# Patient Record
Sex: FEMALE | Race: WHITE | Employment: UNEMPLOYED | URBAN - METROPOLITAN AREA
[De-identification: names, ages, dates, MRNs, and addresses within clinical notes are randomized per-mention and may not be internally consistent; named-entity substitution may affect disease eponyms.]

---

## 2017-07-26 ENCOUNTER — HOSPITAL ENCOUNTER (EMERGENCY)
Age: 77
Discharge: HOME | End: 2017-07-26
Attending: EMERGENCY MEDICINE
Payer: MEDICARE

## 2017-07-26 ENCOUNTER — APPOINTMENT (OUTPATIENT)
Dept: GENERAL RADIOLOGY | Age: 77
End: 2017-07-26
Payer: MEDICARE

## 2017-07-26 VITALS
SYSTOLIC BLOOD PRESSURE: 119 MMHG | DIASTOLIC BLOOD PRESSURE: 93 MMHG | WEIGHT: 170 LBS | OXYGEN SATURATION: 97 % | BODY MASS INDEX: 28.32 KG/M2 | TEMPERATURE: 98.1 F | RESPIRATION RATE: 18 BRPM | HEIGHT: 65 IN | HEART RATE: 91 BPM

## 2017-07-26 DIAGNOSIS — S80.02XA CONTUSION OF LEFT KNEE, INITIAL ENCOUNTER: Primary | ICD-10-CM

## 2017-07-26 DIAGNOSIS — S39.012A LUMBAR STRAIN, INITIAL ENCOUNTER: ICD-10-CM

## 2017-07-26 PROCEDURE — 72100 X-RAY EXAM L-S SPINE 2/3 VWS: CPT

## 2017-07-26 PROCEDURE — 99283 EMERGENCY DEPT VISIT LOW MDM: CPT

## 2017-07-26 PROCEDURE — 73562 X-RAY EXAM OF KNEE 3: CPT

## 2017-07-26 RX ORDER — TRAZODONE HYDROCHLORIDE 150 MG/1
25 TABLET ORAL NIGHTLY
COMMUNITY

## 2017-07-26 RX ORDER — GABAPENTIN 100 MG/1
200 CAPSULE ORAL NIGHTLY
COMMUNITY
End: 2022-06-06

## 2017-07-26 ASSESSMENT — PAIN DESCRIPTION - PAIN TYPE: TYPE: ACUTE PAIN

## 2017-07-26 ASSESSMENT — PAIN DESCRIPTION - LOCATION: LOCATION: KNEE

## 2017-07-26 ASSESSMENT — PAIN DESCRIPTION - DESCRIPTORS: DESCRIPTORS: ACHING;SHARP;BURNING

## 2017-07-26 ASSESSMENT — PAIN SCALES - GENERAL: PAINLEVEL_OUTOF10: 10

## 2018-02-22 ENCOUNTER — APPOINTMENT (OUTPATIENT)
Dept: GENERAL RADIOLOGY | Age: 78
End: 2018-02-22
Payer: MEDICAID

## 2018-02-22 ENCOUNTER — HOSPITAL ENCOUNTER (EMERGENCY)
Age: 78
Discharge: HOME | End: 2018-02-22
Attending: EMERGENCY MEDICINE
Payer: MEDICAID

## 2018-02-22 VITALS
OXYGEN SATURATION: 98 % | HEART RATE: 77 BPM | DIASTOLIC BLOOD PRESSURE: 77 MMHG | TEMPERATURE: 98.2 F | SYSTOLIC BLOOD PRESSURE: 126 MMHG | RESPIRATION RATE: 18 BRPM | BODY MASS INDEX: 28.12 KG/M2 | WEIGHT: 175 LBS | HEIGHT: 66 IN

## 2018-02-22 DIAGNOSIS — M25.471 RIGHT ANKLE EFFUSION: ICD-10-CM

## 2018-02-22 DIAGNOSIS — S82.401A CLOSED FRACTURE OF SHAFT OF RIGHT FIBULA, UNSPECIFIED FRACTURE MORPHOLOGY, INITIAL ENCOUNTER: Primary | ICD-10-CM

## 2018-02-22 PROCEDURE — 99283 EMERGENCY DEPT VISIT LOW MDM: CPT

## 2018-02-22 PROCEDURE — 73610 X-RAY EXAM OF ANKLE: CPT

## 2018-02-22 RX ORDER — HYDROCODONE BITARTRATE AND ACETAMINOPHEN 5; 325 MG/1; MG/1
1 TABLET ORAL EVERY 6 HOURS PRN
Qty: 15 TABLET | Refills: 0 | Status: SHIPPED | OUTPATIENT
Start: 2018-02-22 | End: 2018-03-01

## 2018-02-22 ASSESSMENT — ENCOUNTER SYMPTOMS
NAUSEA: 0
SHORTNESS OF BREATH: 0
EYE REDNESS: 0
SORE THROAT: 0
ABDOMINAL PAIN: 0
COUGH: 0
BACK PAIN: 0
COLOR CHANGE: 1
VOMITING: 0
EYE DISCHARGE: 0

## 2018-02-22 ASSESSMENT — PAIN DESCRIPTION - ORIENTATION: ORIENTATION: RIGHT

## 2018-02-22 ASSESSMENT — PAIN DESCRIPTION - LOCATION: LOCATION: ANKLE

## 2018-02-22 ASSESSMENT — PAIN DESCRIPTION - PAIN TYPE: TYPE: ACUTE PAIN

## 2018-02-22 ASSESSMENT — PAIN SCALES - GENERAL: PAINLEVEL_OUTOF10: 6

## 2018-02-22 ASSESSMENT — PAIN DESCRIPTION - DESCRIPTORS: DESCRIPTORS: ACHING

## 2018-02-22 NOTE — ED PROVIDER NOTES
history of Anxiety; Depression; Hyperlipidemia; and Neuropathy ([facility] [address] SURGICAL HISTORY      has a past surgical history that includes Appendectomy; Tonsillectomy; and Nasal polyp surgery. CURRENT MEDICATIONS       Discharge Medication List as of 2/22/2018  5:55 PM      CONTINUE these medications which have NOT CHANGED    Details   traZODone (DESYREL) 150 MG tablet Take 150 mg by mouth nightlyHistorical Med      gabapentin (NEURONTIN) 100 MG capsule Take 200 mg by mouth nightlyHistorical Med      Rosuvastatin Calcium (CRESTOR PO) Take  by mouth. LORazepam (ATIVAN) 0.5 MG tablet Take 0.5 mg by mouth 2 times daily. venlafaxine (EFFEXOR) 37.5 MG tablet Take 150 mg by mouth daily Historical Med      aspirin 81 MG chewable tablet Take 81 mg by mouth daily. diphenhydrAMINE (BENADRYL) 25 MG tablet Take 25 mg by mouth every 6 hours as needed for Itching. ALLERGIES     has No Known Allergies. FAMILY HISTORY     Not relevant to the current problem. SOCIAL HISTORY      reports that she quit smoking about 35 years ago. She has never used smokeless tobacco. She reports that she does not drink alcohol or use drugs. PHYSICAL EXAM     (7 for level 4, 8 or more for level 5)    ED Triage Vitals [02/22/18 1636]   BP Temp Temp Source Pulse Resp SpO2 Height Weight   126/77 98.2 °F (36.8 °C) Oral 77 18 98 % 5' 5.5\" (1.664 m) 175 lb (79.4 kg)     Physical Exam   Constitutional: She appears well-developed and well-nourished. No distress. Nontoxic. HENT:   Head: Normocephalic and atraumatic. Eyes: No scleral icterus. Cardiovascular: Normal rate. Pulmonary/Chest: Effort normal. No respiratory distress. Musculoskeletal:        Right ankle: She exhibits swelling and ecchymosis. She exhibits normal range of motion, no deformity and normal pulse. Tenderness. Lateral malleolus and medial malleolus tenderness found. No head of 5th metatarsal and no proximal fibula tenderness found. Feet:    Right ankle: swelling and ecchymosis noted over the lateral and medial malleoli; patient has some mild tenderness to palpation over the lateral malleolus; no proximal fibular tenderness; no fifth metatarsal tenderness; palpable dorsal pedal pulse; right lower extremity is neurovascularly intact. Neurological: She is alert. Grossly intact. Skin: Skin is warm and dry. No rash noted. Psychiatric: She has a normal mood and affect. Her behavior is normal.   Vitals reviewed. DIAGNOSTIC RESULTS     RADIOLOGY:   Radiology images were visualized by myself. The Radiologist interpretations were reviewed and are as follows:     XR ANKLE RIGHT (MIN 3 VIEWS) (Final result)   Result time 02/22/18 17:17:26   Final result by Toni Randall MD (02/22/18 17:17:26)                Impression:    Acute laterally displaced fracture through the distal right fibular  metaphysis with small tibiotalar joint effusion and circumferential soft  tissue swelling about the ankle.  Widening of the medial ankle mortise. Narrative:    EXAMINATION:  3 VIEWS OF THE RIGHT ANKLE    2/22/2018 5:08 pm    COMPARISON:  None. HISTORY:  ORDERING SYSTEM PROVIDED HISTORY: pain; missed a step and twisted right ankle  2 hours ago  TECHNOLOGIST PROVIDED HISTORY:  Reason for exam:->pain; missed a step and twisted right ankle 2 hours ago  Ordering Physician Provided Reason for Exam: Paulo Rosas today.  Diffuse ankle pain  Acuity: Acute  Type of Exam: Initial    77-year-old female with acute diffuse right ankle pain after twisting injury  and falling    FINDINGS:  Soft tissue swelling about the ankle. Acute laterally displaced fracture through the distal right fibular  metaphysis.  Small tibiotalar joint effusion. Widening of the medial ankle mortise.  Mild degenerative changes at the  tarsal metatarsal joints and midfoot.     Remote well corticated avulsion fracture fragment inferior to the medial  malleolus.  Boehler's angle is refusing an Ortho-Glass splint. She is also refusing the knee scooter. I will prescribe Norco for pain. She understands that she should be nonweightbearing on the right lower extremity. Follow-up with orthopedics. Nursing staff has contacted Dr. Marga Fleischer office for an appointment. Patient is scheduled for an appointment at 10:10 AM tomorrow. FINAL IMPRESSION      1. Closed fracture of shaft of right fibula, unspecified fracture morphology, initial encounter    2. Right ankle effusion        Active Hospital Problems    Diagnosis Date Noted    Closed fracture of shaft of right fibula [S82.401A] 02/22/2018    Right ankle effusion [M25.471] 02/22/2018     DISPOSITION/PLAN   DISPOSITION - home     Condition on Disposition  Stable    PATIENT REFERRED TO:  Brendon Sibley MD  [address] [facility] [address]  [phone]    Schedule an appointment as soon as possible for a visit in 5 days  Further evaluation and treatment    DISCHARGE MEDICATIONS:  Discharge Medication List as of 2/22/2018  5:55 PM      START taking these medications    Details   HYDROcodone-acetaminophen (NORCO) 5-325 MG per tablet Take 1 tablet by mouth every 6 hours as needed for Pain for up to 7 days. , Disp-15 tablet, R-0Print           (Please note that portions of this note were completed with a voice recognition program.  Efforts were made to edit the dictations but occasionally words are mis-transcribed.)    Patrick Hinojosa PA-C  02/22/18 8711       Patrick Hinojosa PA-C  03/07/18 350 Margaret Rojas PA-C  03/07/18 1400

## 2018-02-23 ENCOUNTER — OFFICE VISIT (OUTPATIENT)
Dept: ORTHOPEDIC SURGERY | Age: 78
End: 2018-02-23
Payer: MEDICAID

## 2018-02-23 VITALS — HEIGHT: 66 IN | WEIGHT: 174.16 LBS | BODY MASS INDEX: 27.99 KG/M2

## 2018-02-23 DIAGNOSIS — S82.401D CLOSED FRACTURE OF SHAFT OF RIGHT FIBULA WITH ROUTINE HEALING, UNSPECIFIED FRACTURE MORPHOLOGY, SUBSEQUENT ENCOUNTER: Primary | ICD-10-CM

## 2018-02-23 PROCEDURE — 99203 OFFICE O/P NEW LOW 30 MIN: CPT | Performed by: ORTHOPAEDIC SURGERY

## 2018-02-23 PROCEDURE — G8427 DOCREV CUR MEDS BY ELIG CLIN: HCPCS | Performed by: ORTHOPAEDIC SURGERY

## 2018-02-23 PROCEDURE — G8484 FLU IMMUNIZE NO ADMIN: HCPCS | Performed by: ORTHOPAEDIC SURGERY

## 2018-02-23 PROCEDURE — G8400 PT W/DXA NO RESULTS DOC: HCPCS | Performed by: ORTHOPAEDIC SURGERY

## 2018-02-23 PROCEDURE — G8419 CALC BMI OUT NRM PARAM NOF/U: HCPCS | Performed by: ORTHOPAEDIC SURGERY

## 2018-02-23 PROCEDURE — 1090F PRES/ABSN URINE INCON ASSESS: CPT | Performed by: ORTHOPAEDIC SURGERY

## 2018-02-23 PROCEDURE — 4040F PNEUMOC VAC/ADMIN/RCVD: CPT | Performed by: ORTHOPAEDIC SURGERY

## 2018-02-23 PROCEDURE — 1123F ACP DISCUSS/DSCN MKR DOCD: CPT | Performed by: ORTHOPAEDIC SURGERY

## 2018-02-23 PROCEDURE — 1036F TOBACCO NON-USER: CPT | Performed by: ORTHOPAEDIC SURGERY

## 2018-02-23 ASSESSMENT — ENCOUNTER SYMPTOMS
SHORTNESS OF BREATH: 0
BACK PAIN: 0
WHEEZING: 0

## 2018-02-27 DIAGNOSIS — S82.401D CLOSED FRACTURE OF SHAFT OF RIGHT FIBULA WITH ROUTINE HEALING, UNSPECIFIED FRACTURE MORPHOLOGY, SUBSEQUENT ENCOUNTER: Primary | ICD-10-CM

## 2018-03-01 ENCOUNTER — HOSPITAL ENCOUNTER (OUTPATIENT)
Dept: GENERAL RADIOLOGY | Age: 78
Discharge: HOME | End: 2018-03-03
Payer: MEDICAID

## 2018-03-01 ENCOUNTER — HOSPITAL ENCOUNTER (OUTPATIENT)
Age: 78
Discharge: HOME | End: 2018-03-01
Payer: MEDICAID

## 2018-03-01 ENCOUNTER — HOSPITAL ENCOUNTER (OUTPATIENT)
Age: 78
Discharge: HOME | End: 2018-03-03
Payer: MEDICAID

## 2018-03-01 ENCOUNTER — OFFICE VISIT (OUTPATIENT)
Dept: ORTHOPEDIC SURGERY | Age: 78
End: 2018-03-01
Payer: MEDICAID

## 2018-03-01 ENCOUNTER — ANESTHESIA EVENT (OUTPATIENT)
Dept: OPERATING ROOM | Age: 78
End: 2018-03-01
Payer: MEDICAID

## 2018-03-01 VITALS
SYSTOLIC BLOOD PRESSURE: 102 MMHG | WEIGHT: 174.16 LBS | BODY MASS INDEX: 27.99 KG/M2 | HEIGHT: 66 IN | HEART RATE: 69 BPM | DIASTOLIC BLOOD PRESSURE: 52 MMHG

## 2018-03-01 DIAGNOSIS — Z01.818 PRE-OP TESTING: ICD-10-CM

## 2018-03-01 DIAGNOSIS — S82.401D CLOSED FRACTURE OF SHAFT OF RIGHT FIBULA WITH ROUTINE HEALING, UNSPECIFIED FRACTURE MORPHOLOGY, SUBSEQUENT ENCOUNTER: Primary | ICD-10-CM

## 2018-03-01 LAB
-: NORMAL
ALBUMIN SERPL-MCNC: 4 G/DL (ref 3.5–5.2)
ALBUMIN/GLOBULIN RATIO: 1.5 (ref 1–2.5)
ALP BLD-CCNC: 84 U/L (ref 35–104)
ALT SERPL-CCNC: 11 U/L (ref 5–33)
AMORPHOUS: NORMAL
ANION GAP SERPL CALCULATED.3IONS-SCNC: 14 MMOL/L (ref 9–17)
AST SERPL-CCNC: 17 U/L
BACTERIA: NORMAL
BILIRUB SERPL-MCNC: 0.57 MG/DL (ref 0.3–1.2)
BILIRUBIN URINE: NEGATIVE
BUN BLDV-MCNC: 17 MG/DL (ref 8–23)
BUN/CREAT BLD: NORMAL (ref 9–20)
CALCIUM SERPL-MCNC: 9.6 MG/DL (ref 8.6–10.4)
CASTS UA: NORMAL /LPF (ref 0–8)
CHLORIDE BLD-SCNC: 103 MMOL/L (ref 98–107)
CO2: 27 MMOL/L (ref 20–31)
COLOR: YELLOW
COMMENT UA: ABNORMAL
CREAT SERPL-MCNC: 0.88 MG/DL (ref 0.5–0.9)
CRYSTALS, UA: NORMAL /HPF
EKG ATRIAL RATE: 68 BPM
EKG Q-T INTERVAL: 406 MS
EKG QRS DURATION: 78 MS
EKG QTC CALCULATION (BAZETT): 435 MS
EKG R AXIS: -3 DEGREES
EKG T AXIS: 46 DEGREES
EKG VENTRICULAR RATE: 69 BPM
EPITHELIAL CELLS UA: NORMAL /HPF (ref 0–5)
GFR AFRICAN AMERICAN: >60 ML/MIN
GFR NON-AFRICAN AMERICAN: >60 ML/MIN
GFR SERPL CREATININE-BSD FRML MDRD: NORMAL ML/MIN/{1.73_M2}
GFR SERPL CREATININE-BSD FRML MDRD: NORMAL ML/MIN/{1.73_M2}
GLUCOSE BLD-MCNC: 88 MG/DL (ref 70–99)
GLUCOSE URINE: NEGATIVE
HCT VFR BLD CALC: 36.7 % (ref 36.3–47.1)
HEMOGLOBIN: 11.4 G/DL (ref 11.9–15.1)
KETONES, URINE: ABNORMAL
LEUKOCYTE ESTERASE, URINE: ABNORMAL
MCH RBC QN AUTO: 27.4 PG (ref 25.2–33.5)
MCHC RBC AUTO-ENTMCNC: 31.1 G/DL (ref 28.4–34.8)
MCV RBC AUTO: 88.2 FL (ref 82.6–102.9)
MUCUS: NORMAL
NITRITE, URINE: NEGATIVE
NRBC AUTOMATED: 0 PER 100 WBC
OTHER OBSERVATIONS UA: NORMAL
PDW BLD-RTO: 14.8 % (ref 11.8–14.4)
PH UA: 6 (ref 5–8)
PLATELET # BLD: 289 K/UL (ref 138–453)
PMV BLD AUTO: 11.1 FL (ref 8.1–13.5)
POTASSIUM SERPL-SCNC: 4.1 MMOL/L (ref 3.7–5.3)
PROTEIN UA: NEGATIVE
RBC # BLD: 4.16 M/UL (ref 3.95–5.11)
RBC UA: NORMAL /HPF (ref 0–4)
RENAL EPITHELIAL, UA: NORMAL /HPF
SODIUM BLD-SCNC: 144 MMOL/L (ref 135–144)
SPECIFIC GRAVITY UA: 1.02 (ref 1–1.03)
TOTAL PROTEIN: 6.6 G/DL (ref 6.4–8.3)
TRICHOMONAS: NORMAL
TURBIDITY: CLEAR
URINE HGB: NEGATIVE
UROBILINOGEN, URINE: NORMAL
WBC # BLD: 8.4 K/UL (ref 3.5–11.3)
WBC UA: NORMAL /HPF (ref 0–5)
YEAST: NORMAL

## 2018-03-01 PROCEDURE — 71046 X-RAY EXAM CHEST 2 VIEWS: CPT

## 2018-03-01 PROCEDURE — G8427 DOCREV CUR MEDS BY ELIG CLIN: HCPCS | Performed by: ORTHOPAEDIC SURGERY

## 2018-03-01 PROCEDURE — 1090F PRES/ABSN URINE INCON ASSESS: CPT | Performed by: ORTHOPAEDIC SURGERY

## 2018-03-01 PROCEDURE — 4040F PNEUMOC VAC/ADMIN/RCVD: CPT | Performed by: ORTHOPAEDIC SURGERY

## 2018-03-01 PROCEDURE — 80053 COMPREHEN METABOLIC PANEL: CPT

## 2018-03-01 PROCEDURE — G8484 FLU IMMUNIZE NO ADMIN: HCPCS | Performed by: ORTHOPAEDIC SURGERY

## 2018-03-01 PROCEDURE — G8419 CALC BMI OUT NRM PARAM NOF/U: HCPCS | Performed by: ORTHOPAEDIC SURGERY

## 2018-03-01 PROCEDURE — 93005 ELECTROCARDIOGRAM TRACING: CPT

## 2018-03-01 PROCEDURE — 85027 COMPLETE CBC AUTOMATED: CPT

## 2018-03-01 PROCEDURE — 99213 OFFICE O/P EST LOW 20 MIN: CPT | Performed by: ORTHOPAEDIC SURGERY

## 2018-03-01 PROCEDURE — 36415 COLL VENOUS BLD VENIPUNCTURE: CPT

## 2018-03-01 PROCEDURE — G8400 PT W/DXA NO RESULTS DOC: HCPCS | Performed by: ORTHOPAEDIC SURGERY

## 2018-03-01 PROCEDURE — 1036F TOBACCO NON-USER: CPT | Performed by: ORTHOPAEDIC SURGERY

## 2018-03-01 PROCEDURE — 1123F ACP DISCUSS/DSCN MKR DOCD: CPT | Performed by: ORTHOPAEDIC SURGERY

## 2018-03-01 PROCEDURE — 81001 URINALYSIS AUTO W/SCOPE: CPT

## 2018-03-01 ASSESSMENT — ENCOUNTER SYMPTOMS
SHORTNESS OF BREATH: 0
BACK PAIN: 0
WHEEZING: 0

## 2018-03-01 NOTE — PROGRESS NOTES
Exam  MS:  Skin is ecchymotic but intact with mild to moderate swelling right ankle. Positive wrinkle sign is appreciated. No skin lesions are noted. Neuro: alert. oriented  Eyes: Extra-ocular muscles intact  Mouth: Oral mucosa moist. No perioral lesions  Pulm: Respirations unlabored and regular. Skin: warm, well perfused  Psych:   Patient has good fund of knowledge and displays understanging of exam, diagnosis, and plan. Radiology:     History: Right ankle fracture    Findings:  AP, lateral, mortise view x-rays of the right ankle done in the office today shows lateral malleolus fracture with mild displacement and widening of the medial joint space of the ankle. No further evidence of fracture, subluxation, dislocation, radiopaque foreign body, repeat tumors noted. Impression: Right ankle fracture with widened ankle mortise as described above with suspicion for syndesmotic injury. Assessment:      1. Closed fracture of shaft of right fibula with routine healing, unspecified fracture morphology, subsequent encounter    2. Pre-op testing       Plan:      Discussed etiology and natural history of today's ankle xray. The treatment options may include oral anti-inflammatories, bracing, injections, advanced imaging, activity modification, physical therapy and/or surgical intervention. The patient would like to proceed with surgery, scheduled for tomorrow 3/2/18. The patient will follow up in 2 weeks post op. The patient understands the plan. We discussed that the patient should call us with any concerns or questions. Follow up:Return in about 2 weeks (around 3/15/2018). KORY Nash am scribing for and in the presence of Dr. Mirna Ambriz. 3/2/2018 5:21 PM    I have reviewed and made changes accordingly to the work scribed by Aviva Mckay, [facility] [employer] The documentation accurately reflects work and decisions made by me.   I have also reviewed documentation completed by clinical staff.    Domonique Ohara DO, [facility]  3/2/2018 5:22 PM      No orders of the defined types were placed in this encounter.          Orders Placed This Encounter   Procedures    XR CHEST STANDARD (2 VW)     Standing Status:   Future     Number of Occurrences:   1     Standing Expiration Date:   3/21/2019     Order Specific Question:   Reason for exam:     Answer:   pre-op    CBC     Standing Status:   Future     Number of Occurrences:   1     Standing Expiration Date:   3/2/2019    Comprehensive Metabolic Panel     Standing Status:   Future     Number of Occurrences:   1     Standing Expiration Date:   6/29/2018    Urinalysis     Standing Status:   Future     Number of Occurrences:   1     Standing Expiration Date:   3/2/2019    EKG 12 Lead     Standing Status:   Future     Standing Expiration Date:   3/2/2019     Order Specific Question:   Reason for Exam?     Answer:   Pre-op       Electronically signed by Javier Orourke DO, Tollie Robes on 3/2/2018 at 5:21 PM

## 2018-03-02 ENCOUNTER — ANESTHESIA (OUTPATIENT)
Dept: OPERATING ROOM | Age: 78
End: 2018-03-02
Payer: MEDICAID

## 2018-03-02 ENCOUNTER — HOSPITAL ENCOUNTER (OUTPATIENT)
Age: 78
Setting detail: OBSERVATION
Discharge: NURSING, REHAB OR LONG-TERM CARE | End: 2018-03-03
Attending: ORTHOPAEDIC SURGERY | Admitting: ORTHOPAEDIC SURGERY
Payer: MEDICAID

## 2018-03-02 ENCOUNTER — APPOINTMENT (OUTPATIENT)
Dept: GENERAL RADIOLOGY | Age: 78
End: 2018-03-02
Attending: ORTHOPAEDIC SURGERY
Payer: MEDICAID

## 2018-03-02 VITALS
RESPIRATION RATE: 6 BRPM | OXYGEN SATURATION: 100 % | SYSTOLIC BLOOD PRESSURE: 109 MMHG | TEMPERATURE: 98.1 F | DIASTOLIC BLOOD PRESSURE: 54 MMHG

## 2018-03-02 DIAGNOSIS — S82.401A CLOSED FRACTURE OF SHAFT OF RIGHT FIBULA, UNSPECIFIED FRACTURE MORPHOLOGY, INITIAL ENCOUNTER: Primary | ICD-10-CM

## 2018-03-02 PROBLEM — S93.431A ANKLE SYNDESMOSIS DISRUPTION, RIGHT, INITIAL ENCOUNTER: Status: ACTIVE | Noted: 2018-02-22

## 2018-03-02 PROBLEM — S82.61XA CLOSED HIGH LATERAL MALLEOLUS FRACTURE, RIGHT, INITIAL ENCOUNTER: Status: ACTIVE | Noted: 2018-03-02

## 2018-03-02 PROCEDURE — G0378 HOSPITAL OBSERVATION PER HR: HCPCS

## 2018-03-02 PROCEDURE — 73610 X-RAY EXAM OF ANKLE: CPT

## 2018-03-02 PROCEDURE — 6360000002 HC RX W HCPCS: Performed by: STUDENT IN AN ORGANIZED HEALTH CARE EDUCATION/TRAINING PROGRAM

## 2018-03-02 PROCEDURE — 2580000003 HC RX 258: Performed by: ANESTHESIOLOGY

## 2018-03-02 PROCEDURE — 96375 TX/PRO/DX INJ NEW DRUG ADDON: CPT

## 2018-03-02 PROCEDURE — 96376 TX/PRO/DX INJ SAME DRUG ADON: CPT

## 2018-03-02 PROCEDURE — 6360000002 HC RX W HCPCS: Performed by: ANESTHESIOLOGY

## 2018-03-02 PROCEDURE — 3700000000 HC ANESTHESIA ATTENDED CARE: Performed by: ORTHOPAEDIC SURGERY

## 2018-03-02 PROCEDURE — 2720000010 HC SURG SUPPLY STERILE: Performed by: ORTHOPAEDIC SURGERY

## 2018-03-02 PROCEDURE — 2580000003 HC RX 258: Performed by: ORTHOPAEDIC SURGERY

## 2018-03-02 PROCEDURE — 27792 TREATMENT OF ANKLE FRACTURE: CPT | Performed by: ORTHOPAEDIC SURGERY

## 2018-03-02 PROCEDURE — 3600000014 HC SURGERY LEVEL 4 ADDTL 15MIN: Performed by: ORTHOPAEDIC SURGERY

## 2018-03-02 PROCEDURE — 3600000004 HC SURGERY LEVEL 4 BASE: Performed by: ORTHOPAEDIC SURGERY

## 2018-03-02 PROCEDURE — 27829 TREAT LOWER LEG JOINT: CPT | Performed by: ORTHOPAEDIC SURGERY

## 2018-03-02 PROCEDURE — 3700000001 HC ADD 15 MINUTES (ANESTHESIA): Performed by: ORTHOPAEDIC SURGERY

## 2018-03-02 PROCEDURE — 96374 THER/PROPH/DIAG INJ IV PUSH: CPT

## 2018-03-02 PROCEDURE — C1713 ANCHOR/SCREW BN/BN,TIS/BN: HCPCS | Performed by: ORTHOPAEDIC SURGERY

## 2018-03-02 PROCEDURE — 7100000001 HC PACU RECOVERY - ADDTL 15 MIN: Performed by: ORTHOPAEDIC SURGERY

## 2018-03-02 PROCEDURE — 7100000000 HC PACU RECOVERY - FIRST 15 MIN: Performed by: ORTHOPAEDIC SURGERY

## 2018-03-02 PROCEDURE — 6370000000 HC RX 637 (ALT 250 FOR IP): Performed by: STUDENT IN AN ORGANIZED HEALTH CARE EDUCATION/TRAINING PROGRAM

## 2018-03-02 PROCEDURE — 6360000002 HC RX W HCPCS: Performed by: NURSE ANESTHETIST, CERTIFIED REGISTERED

## 2018-03-02 PROCEDURE — 2500000003 HC RX 250 WO HCPCS: Performed by: NURSE ANESTHETIST, CERTIFIED REGISTERED

## 2018-03-02 DEVICE — PLATE BNE L86MM 4 H R DST LAT FIBULAR S STL LOK COMPR FOR: Type: IMPLANTABLE DEVICE | Site: ANKLE | Status: FUNCTIONAL

## 2018-03-02 DEVICE — SCREW BNE L55MM DIA3.5MM CORT S STL ST NONCANNULATED LOK: Type: IMPLANTABLE DEVICE | Site: ANKLE | Status: FUNCTIONAL

## 2018-03-02 DEVICE — SCREW BNE L14MM DIA3.5MM CORT S STL ST NONCANNULATED LOK: Type: IMPLANTABLE DEVICE | Site: ANKLE | Status: FUNCTIONAL

## 2018-03-02 DEVICE — SCREW BNE L16MM DIA3.5MM CORT S STL ST NONCANNULATED LOK: Type: IMPLANTABLE DEVICE | Site: ANKLE | Status: FUNCTIONAL

## 2018-03-02 DEVICE — SCREW BNE L16MM DIA4MM CANC S STL ST CANN NONLOCKING FULL: Type: IMPLANTABLE DEVICE | Site: ANKLE | Status: FUNCTIONAL

## 2018-03-02 DEVICE — SCREW BNE L14MM DIA2.7MM CORT S STL ST LOK FULL THRD T8: Type: IMPLANTABLE DEVICE | Site: ANKLE | Status: FUNCTIONAL

## 2018-03-02 DEVICE — SCREW BNE L12MM DIA2.7MM CORT S STL ST LOK FULL THRD T8: Type: IMPLANTABLE DEVICE | Site: ANKLE | Status: FUNCTIONAL

## 2018-03-02 RX ORDER — FENTANYL CITRATE 50 UG/ML
INJECTION, SOLUTION INTRAMUSCULAR; INTRAVENOUS PRN
Status: DISCONTINUED | OUTPATIENT
Start: 2018-03-02 | End: 2018-03-02 | Stop reason: SDUPTHER

## 2018-03-02 RX ORDER — ONDANSETRON 2 MG/ML
INJECTION INTRAMUSCULAR; INTRAVENOUS PRN
Status: DISCONTINUED | OUTPATIENT
Start: 2018-03-02 | End: 2018-03-02 | Stop reason: SDUPTHER

## 2018-03-02 RX ORDER — MAGNESIUM HYDROXIDE 1200 MG/15ML
LIQUID ORAL CONTINUOUS PRN
Status: DISCONTINUED | OUTPATIENT
Start: 2018-03-02 | End: 2018-03-02 | Stop reason: HOSPADM

## 2018-03-02 RX ORDER — SODIUM CHLORIDE 0.9 % (FLUSH) 0.9 %
10 SYRINGE (ML) INJECTION EVERY 12 HOURS SCHEDULED
Status: DISCONTINUED | OUTPATIENT
Start: 2018-03-02 | End: 2018-03-03 | Stop reason: HOSPADM

## 2018-03-02 RX ORDER — LORATADINE 10 MG/1
10 CAPSULE, LIQUID FILLED ORAL DAILY
COMMUNITY
End: 2022-06-06

## 2018-03-02 RX ORDER — DEXAMETHASONE SODIUM PHOSPHATE 10 MG/ML
INJECTION INTRAMUSCULAR; INTRAVENOUS PRN
Status: DISCONTINUED | OUTPATIENT
Start: 2018-03-02 | End: 2018-03-02 | Stop reason: SDUPTHER

## 2018-03-02 RX ORDER — ROPIVACAINE HYDROCHLORIDE 5 MG/ML
30 INJECTION, SOLUTION EPIDURAL; INFILTRATION; PERINEURAL ONCE
Status: DISCONTINUED | OUTPATIENT
Start: 2018-03-02 | End: 2018-03-02

## 2018-03-02 RX ORDER — OXYCODONE HYDROCHLORIDE AND ACETAMINOPHEN 5; 325 MG/1; MG/1
2 TABLET ORAL EVERY 4 HOURS PRN
Status: DISCONTINUED | OUTPATIENT
Start: 2018-03-02 | End: 2018-03-03 | Stop reason: HOSPADM

## 2018-03-02 RX ORDER — VENLAFAXINE 75 MG/1
150 TABLET ORAL DAILY
Status: DISCONTINUED | OUTPATIENT
Start: 2018-03-02 | End: 2018-03-03 | Stop reason: HOSPADM

## 2018-03-02 RX ORDER — ROCURONIUM BROMIDE 10 MG/ML
INJECTION, SOLUTION INTRAVENOUS PRN
Status: DISCONTINUED | OUTPATIENT
Start: 2018-03-02 | End: 2018-03-02 | Stop reason: SDUPTHER

## 2018-03-02 RX ORDER — GABAPENTIN 100 MG/1
200 CAPSULE ORAL NIGHTLY
Status: DISCONTINUED | OUTPATIENT
Start: 2018-03-02 | End: 2018-03-03 | Stop reason: HOSPADM

## 2018-03-02 RX ORDER — ONDANSETRON 2 MG/ML
4 INJECTION INTRAMUSCULAR; INTRAVENOUS EVERY 6 HOURS PRN
Status: DISCONTINUED | OUTPATIENT
Start: 2018-03-02 | End: 2018-03-03 | Stop reason: HOSPADM

## 2018-03-02 RX ORDER — LIDOCAINE HYDROCHLORIDE 10 MG/ML
INJECTION, SOLUTION EPIDURAL; INFILTRATION; INTRACAUDAL; PERINEURAL PRN
Status: DISCONTINUED | OUTPATIENT
Start: 2018-03-02 | End: 2018-03-02 | Stop reason: SDUPTHER

## 2018-03-02 RX ORDER — ACETAMINOPHEN 325 MG/1
650 TABLET ORAL EVERY 4 HOURS PRN
Status: DISCONTINUED | OUTPATIENT
Start: 2018-03-02 | End: 2018-03-03 | Stop reason: HOSPADM

## 2018-03-02 RX ORDER — GLYCOPYRROLATE 0.2 MG/ML
INJECTION INTRAMUSCULAR; INTRAVENOUS PRN
Status: DISCONTINUED | OUTPATIENT
Start: 2018-03-02 | End: 2018-03-02 | Stop reason: SDUPTHER

## 2018-03-02 RX ORDER — MIDAZOLAM HYDROCHLORIDE 1 MG/ML
2 INJECTION INTRAMUSCULAR; INTRAVENOUS ONCE
Status: COMPLETED | OUTPATIENT
Start: 2018-03-02 | End: 2018-03-02

## 2018-03-02 RX ORDER — MORPHINE SULFATE 2 MG/ML
2 INJECTION, SOLUTION INTRAMUSCULAR; INTRAVENOUS
Status: DISCONTINUED | OUTPATIENT
Start: 2018-03-02 | End: 2018-03-03 | Stop reason: HOSPADM

## 2018-03-02 RX ORDER — LORAZEPAM 0.5 MG/1
0.5 TABLET ORAL 2 TIMES DAILY
Status: DISCONTINUED | OUTPATIENT
Start: 2018-03-02 | End: 2018-03-03

## 2018-03-02 RX ORDER — OXYCODONE HYDROCHLORIDE AND ACETAMINOPHEN 5; 325 MG/1; MG/1
1 TABLET ORAL EVERY 4 HOURS PRN
Status: DISCONTINUED | OUTPATIENT
Start: 2018-03-02 | End: 2018-03-03 | Stop reason: HOSPADM

## 2018-03-02 RX ORDER — MORPHINE SULFATE 2 MG/ML
1 INJECTION, SOLUTION INTRAMUSCULAR; INTRAVENOUS
Status: DISCONTINUED | OUTPATIENT
Start: 2018-03-02 | End: 2018-03-03 | Stop reason: HOSPADM

## 2018-03-02 RX ORDER — SODIUM CHLORIDE, SODIUM LACTATE, POTASSIUM CHLORIDE, CALCIUM CHLORIDE 600; 310; 30; 20 MG/100ML; MG/100ML; MG/100ML; MG/100ML
INJECTION, SOLUTION INTRAVENOUS CONTINUOUS
Status: DISCONTINUED | OUTPATIENT
Start: 2018-03-02 | End: 2018-03-02

## 2018-03-02 RX ORDER — ASPIRIN 81 MG/1
81 TABLET, CHEWABLE ORAL DAILY
Status: DISCONTINUED | OUTPATIENT
Start: 2018-03-02 | End: 2018-03-03 | Stop reason: HOSPADM

## 2018-03-02 RX ORDER — SODIUM CHLORIDE 0.9 % (FLUSH) 0.9 %
10 SYRINGE (ML) INJECTION PRN
Status: DISCONTINUED | OUTPATIENT
Start: 2018-03-02 | End: 2018-03-03 | Stop reason: HOSPADM

## 2018-03-02 RX ORDER — FENTANYL CITRATE 50 UG/ML
100 INJECTION, SOLUTION INTRAMUSCULAR; INTRAVENOUS ONCE
Status: DISCONTINUED | OUTPATIENT
Start: 2018-03-02 | End: 2018-03-02

## 2018-03-02 RX ORDER — PROPOFOL 10 MG/ML
INJECTION, EMULSION INTRAVENOUS PRN
Status: DISCONTINUED | OUTPATIENT
Start: 2018-03-02 | End: 2018-03-02 | Stop reason: SDUPTHER

## 2018-03-02 RX ADMIN — ROCURONIUM BROMIDE 25 MG: 10 INJECTION INTRAVENOUS at 14:14

## 2018-03-02 RX ADMIN — HYDROMORPHONE HYDROCHLORIDE 0.25 MG: 1 INJECTION, SOLUTION INTRAMUSCULAR; INTRAVENOUS; SUBCUTANEOUS at 16:41

## 2018-03-02 RX ADMIN — LIDOCAINE HYDROCHLORIDE 10 MG: 10 INJECTION, SOLUTION EPIDURAL; INFILTRATION; INTRACAUDAL; PERINEURAL at 14:52

## 2018-03-02 RX ADMIN — PROPOFOL 150 MG: 10 INJECTION, EMULSION INTRAVENOUS at 14:14

## 2018-03-02 RX ADMIN — FENTANYL CITRATE 25 MCG: 50 INJECTION INTRAMUSCULAR; INTRAVENOUS at 14:24

## 2018-03-02 RX ADMIN — LORAZEPAM 0.5 MG: 0.5 TABLET ORAL at 20:22

## 2018-03-02 RX ADMIN — FENTANYL CITRATE 50 MCG: 50 INJECTION INTRAMUSCULAR; INTRAVENOUS at 14:32

## 2018-03-02 RX ADMIN — ONDANSETRON 4 MG: 2 INJECTION INTRAMUSCULAR; INTRAVENOUS at 14:52

## 2018-03-02 RX ADMIN — FENTANYL CITRATE 25 MCG: 50 INJECTION INTRAMUSCULAR; INTRAVENOUS at 14:52

## 2018-03-02 RX ADMIN — GABAPENTIN 200 MG: 100 CAPSULE ORAL at 20:23

## 2018-03-02 RX ADMIN — MIDAZOLAM HYDROCHLORIDE 2 MG: 1 INJECTION, SOLUTION INTRAMUSCULAR; INTRAVENOUS at 14:09

## 2018-03-02 RX ADMIN — FENTANYL CITRATE 25 MCG: 50 INJECTION INTRAMUSCULAR; INTRAVENOUS at 14:29

## 2018-03-02 RX ADMIN — FENTANYL CITRATE 50 MCG: 50 INJECTION INTRAMUSCULAR; INTRAVENOUS at 14:09

## 2018-03-02 RX ADMIN — GLYCOPYRROLATE 0.8 MG: 0.2 INJECTION INTRAMUSCULAR; INTRAVENOUS at 15:05

## 2018-03-02 RX ADMIN — Medication 2 G: at 23:53

## 2018-03-02 RX ADMIN — SODIUM CHLORIDE, POTASSIUM CHLORIDE, SODIUM LACTATE AND CALCIUM CHLORIDE: 600; 310; 30; 20 INJECTION, SOLUTION INTRAVENOUS at 12:30

## 2018-03-02 RX ADMIN — LIDOCAINE HYDROCHLORIDE 40 MG: 10 INJECTION, SOLUTION EPIDURAL; INFILTRATION; INTRACAUDAL; PERINEURAL at 14:14

## 2018-03-02 RX ADMIN — HYDROMORPHONE HYDROCHLORIDE 0.25 MG: 1 INJECTION, SOLUTION INTRAMUSCULAR; INTRAVENOUS; SUBCUTANEOUS at 16:27

## 2018-03-02 RX ADMIN — FENTANYL CITRATE 50 MCG: 50 INJECTION INTRAMUSCULAR; INTRAVENOUS at 15:18

## 2018-03-02 RX ADMIN — TRAZODONE HYDROCHLORIDE 150 MG: 100 TABLET ORAL at 20:23

## 2018-03-02 RX ADMIN — Medication 2 G: at 14:26

## 2018-03-02 RX ADMIN — FENTANYL CITRATE 25 MCG: 50 INJECTION INTRAMUSCULAR; INTRAVENOUS at 15:07

## 2018-03-02 RX ADMIN — DEXAMETHASONE SODIUM PHOSPHATE 10 MG: 10 INJECTION INTRAMUSCULAR; INTRAVENOUS at 14:24

## 2018-03-02 RX ADMIN — NEOSTIGMINE METHYLSULFATE 4 MG: 1 INJECTION, SOLUTION INTRAMUSCULAR; INTRAVENOUS; SUBCUTANEOUS at 15:05

## 2018-03-02 ASSESSMENT — PULMONARY FUNCTION TESTS
PIF_VALUE: 3
PIF_VALUE: 19
PIF_VALUE: 3
PIF_VALUE: 4
PIF_VALUE: 19
PIF_VALUE: 19
PIF_VALUE: 9
PIF_VALUE: 3
PIF_VALUE: 3
PIF_VALUE: 19
PIF_VALUE: 19
PIF_VALUE: 2
PIF_VALUE: 2
PIF_VALUE: 19
PIF_VALUE: 3
PIF_VALUE: 21
PIF_VALUE: 3
PIF_VALUE: 19
PIF_VALUE: 18
PIF_VALUE: 19
PIF_VALUE: 18
PIF_VALUE: 21
PIF_VALUE: 1
PIF_VALUE: 2
PIF_VALUE: 2
PIF_VALUE: 18
PIF_VALUE: 5
PIF_VALUE: 9
PIF_VALUE: 5
PIF_VALUE: 16
PIF_VALUE: 11
PIF_VALUE: 18
PIF_VALUE: 8
PIF_VALUE: 3
PIF_VALUE: 20
PIF_VALUE: 19
PIF_VALUE: 23
PIF_VALUE: 15
PIF_VALUE: 19
PIF_VALUE: 19
PIF_VALUE: 18
PIF_VALUE: 12
PIF_VALUE: 3
PIF_VALUE: 26
PIF_VALUE: 19
PIF_VALUE: 3
PIF_VALUE: 19
PIF_VALUE: 1
PIF_VALUE: 3
PIF_VALUE: 13
PIF_VALUE: 23
PIF_VALUE: 19
PIF_VALUE: 19
PIF_VALUE: 1
PIF_VALUE: 3
PIF_VALUE: 3
PIF_VALUE: 2
PIF_VALUE: 18
PIF_VALUE: 19
PIF_VALUE: 3
PIF_VALUE: 4
PIF_VALUE: 21
PIF_VALUE: 6
PIF_VALUE: 18
PIF_VALUE: 1
PIF_VALUE: 3
PIF_VALUE: 19
PIF_VALUE: 3
PIF_VALUE: 2
PIF_VALUE: 20
PIF_VALUE: 3
PIF_VALUE: 19
PIF_VALUE: 19
PIF_VALUE: 21
PIF_VALUE: 16
PIF_VALUE: 19
PIF_VALUE: 11
PIF_VALUE: 19
PIF_VALUE: 19
PIF_VALUE: 1
PIF_VALUE: 16
PIF_VALUE: 20
PIF_VALUE: 2
PIF_VALUE: 3

## 2018-03-02 ASSESSMENT — PAIN DESCRIPTION - PAIN TYPE: TYPE: ACUTE PAIN;SURGICAL PAIN

## 2018-03-02 ASSESSMENT — PAIN - FUNCTIONAL ASSESSMENT: PAIN_FUNCTIONAL_ASSESSMENT: 0-10

## 2018-03-02 ASSESSMENT — PAIN SCALES - GENERAL
PAINLEVEL_OUTOF10: 2
PAINLEVEL_OUTOF10: 0
PAINLEVEL_OUTOF10: 6
PAINLEVEL_OUTOF10: 4
PAINLEVEL_OUTOF10: 2
PAINLEVEL_OUTOF10: 5

## 2018-03-02 ASSESSMENT — PAIN DESCRIPTION - ORIENTATION: ORIENTATION: RIGHT

## 2018-03-02 ASSESSMENT — ENCOUNTER SYMPTOMS
STRIDOR: 0
SHORTNESS OF BREATH: 0

## 2018-03-02 ASSESSMENT — PAIN DESCRIPTION - LOCATION: LOCATION: ANKLE

## 2018-03-02 NOTE — ANESTHESIA PRE PROCEDURE
Patient Active Problem List   Diagnosis Code    Closed fracture of shaft of right fibula S82.401A    Right ankle effusion M25.471       Past Medical History:        Diagnosis Date    Anxiety     Asthma     Depression     Hyperlipidemia     Neuropathy ([address] 2014    bottom of feet       Past Surgical History:        Procedure Laterality Date    APPENDECTOMY      CATARACT REMOVAL WITH IMPLANT      NASAL POLYP SURGERY      TONSILLECTOMY         Social History:    Social History   Substance Use Topics    Smoking status: Former Smoker     Quit date: 8/17/1982    Smokeless tobacco: Never Used    Alcohol use No                                Counseling given: Not Answered      Vital Signs (Current):   Vitals:    03/02/18 1157 03/02/18 1214   BP:  (!) 141/94   Pulse:  75   Resp:  16   Temp:  97.5 °F (36.4 °C)   TempSrc:  Temporal   SpO2:  100%   Weight: 175 lb (79.4 kg)    Height: 5' 5.5\" (1.664 m)                                               BP Readings from Last 3 Encounters:   03/02/18 (!) 141/94   03/01/18 (!) 102/52   02/22/18 126/77       NPO Status: Time of last liquid consumption: 1900                        Time of last solid consumption: 2300                        Date of last liquid consumption: 03/01/18                        Date of last solid food consumption: 03/01/18    BMI:   Wt Readings from Last 3 Encounters:   03/02/18 175 lb (79.4 kg)   03/01/18 174 lb 2.6 oz (79 kg)   02/23/18 174 lb 2.6 oz (79 kg)     Body mass index is 28.68 kg/m².     CBC:   Lab Results   Component Value Date    WBC 8.4 03/01/2018    RBC 4.16 03/01/2018    HGB 11.4 03/01/2018    HCT 36.7 03/01/2018    MCV 88.2 03/01/2018    RDW 14.8 03/01/2018     03/01/2018       CMP:   Lab Results   Component Value Date     03/01/2018    K 4.1 03/01/2018     03/01/2018    CO2 27 03/01/2018    BUN 17 03/01/2018    CREATININE 0.88 03/01/2018    GFRAA >60 03/01/2018    LABGLOM >60 03/01/2018    GLUCOSE 88 03/01/2018 PROT 6.6 03/01/2018    CALCIUM 9.6 03/01/2018    BILITOT 0.57 03/01/2018    ALKPHOS 84 03/01/2018    AST 17 03/01/2018    ALT 11 03/01/2018       POC Tests: No results for input(s): POCGLU, POCNA, POCK, POCCL, POCBUN, POCHEMO, POCHCT in the last 72 hours. Coags: No results found for: PROTIME, INR, APTT    HCG (If Applicable): No results found for: PREGTESTUR, PREGSERUM, HCG, HCGQUANT     ABGs: No results found for: PHART, PO2ART, FQX5MUD, BEP6IXT, BEART, J8RODSVL     Type & Screen (If Applicable):  No results found for: LABABO, LABRH    Anesthesia Evaluation   no history of anesthetic complications:   Airway: Mallampati: II     Neck ROM: full  Mouth opening: > = 3 FB Dental:          Pulmonary: breath sounds clear to auscultation  (+) asthma:     (-) COPD, shortness of breath, sleep apnea, rhonchi, wheezes, rales and stridor                           Cardiovascular:    (+) hyperlipidemia    (-) pacemaker, past MI, CAD, CABG/stent,  angina and  CHF        Rate: normal                    Neuro/Psych:   (+) depression/anxiety    (-) seizures and CVA           GI/Hepatic/Renal:        (-) GERD, liver disease and no renal disease       Endo/Other:        (-) diabetes mellitus, hypothyroidism, hyperthyroidism, blood dyscrasia               Abdominal:       Abdomen: soft. Vascular:                                        Anesthesia Plan      general     ASA 2     (    Patient refuses spinal    Consent on chart for PNB    )  Induction: intravenous. Anesthetic plan and risks discussed with patient.                       Aubree Rutledge MD   3/2/2018

## 2018-03-02 NOTE — H&P
H&P Update    Patient's History and Physical from February 23, 2018 was reviewed. Patient examined. Cardiovascular: Regular rate, no dependent edema, distal pulses 2+  Respiratory: Chest symmetric, no accessory muscle use, normal respirations    There has been no change. Will proceed with R Ankle ORIF with Dr. Antonino Calloway.     David Patel DO  12:06 PM 3/2/2018

## 2018-03-03 VITALS
HEIGHT: 65 IN | WEIGHT: 175 LBS | BODY MASS INDEX: 29.16 KG/M2 | SYSTOLIC BLOOD PRESSURE: 124 MMHG | TEMPERATURE: 98.4 F | HEART RATE: 76 BPM | OXYGEN SATURATION: 99 % | RESPIRATION RATE: 16 BRPM | DIASTOLIC BLOOD PRESSURE: 66 MMHG

## 2018-03-03 LAB
ABSOLUTE EOS #: 0 K/UL (ref 0–0.4)
ABSOLUTE IMMATURE GRANULOCYTE: 0 K/UL (ref 0–0.3)
ABSOLUTE LYMPH #: 0.49 K/UL (ref 1–4.8)
ABSOLUTE MONO #: 0.49 K/UL (ref 0.1–0.8)
ALBUMIN SERPL-MCNC: 3.5 G/DL (ref 3.5–5.2)
ALBUMIN/GLOBULIN RATIO: 1.7 (ref 1–2.5)
ALP BLD-CCNC: 78 U/L (ref 35–104)
ALT SERPL-CCNC: 9 U/L (ref 5–33)
ANION GAP SERPL CALCULATED.3IONS-SCNC: 10 MMOL/L (ref 9–17)
AST SERPL-CCNC: 15 U/L
BASOPHILS # BLD: 0 % (ref 0–2)
BASOPHILS ABSOLUTE: 0 K/UL (ref 0–0.2)
BILIRUB SERPL-MCNC: 0.48 MG/DL (ref 0.3–1.2)
BUN BLDV-MCNC: 16 MG/DL (ref 8–23)
BUN/CREAT BLD: ABNORMAL (ref 9–20)
CALCIUM SERPL-MCNC: 9.1 MG/DL (ref 8.6–10.4)
CHLORIDE BLD-SCNC: 107 MMOL/L (ref 98–107)
CO2: 25 MMOL/L (ref 20–31)
CREAT SERPL-MCNC: 0.82 MG/DL (ref 0.5–0.9)
DIFFERENTIAL TYPE: ABNORMAL
EOSINOPHILS RELATIVE PERCENT: 0 % (ref 1–4)
GFR AFRICAN AMERICAN: >60 ML/MIN
GFR NON-AFRICAN AMERICAN: >60 ML/MIN
GFR SERPL CREATININE-BSD FRML MDRD: ABNORMAL ML/MIN/{1.73_M2}
GFR SERPL CREATININE-BSD FRML MDRD: ABNORMAL ML/MIN/{1.73_M2}
GLUCOSE BLD-MCNC: 126 MG/DL (ref 70–99)
HCT VFR BLD CALC: 33.5 % (ref 36.3–47.1)
HEMOGLOBIN: 10.5 G/DL (ref 11.9–15.1)
IMMATURE GRANULOCYTES: 0 %
LYMPHOCYTES # BLD: 5 % (ref 24–44)
MCH RBC QN AUTO: 27.9 PG (ref 25.2–33.5)
MCHC RBC AUTO-ENTMCNC: 31.3 G/DL (ref 28.4–34.8)
MCV RBC AUTO: 88.9 FL (ref 82.6–102.9)
MONOCYTES # BLD: 5 % (ref 1–7)
MORPHOLOGY: ABNORMAL
NRBC AUTOMATED: 0 PER 100 WBC
PDW BLD-RTO: 14.9 % (ref 11.8–14.4)
PLATELET # BLD: 277 K/UL (ref 138–453)
PLATELET ESTIMATE: ABNORMAL
PMV BLD AUTO: 10.6 FL (ref 8.1–13.5)
POTASSIUM SERPL-SCNC: 4.3 MMOL/L (ref 3.7–5.3)
RBC # BLD: 3.77 M/UL (ref 3.95–5.11)
RBC # BLD: ABNORMAL 10*6/UL
SEG NEUTROPHILS: 90 % (ref 36–66)
SEGMENTED NEUTROPHILS ABSOLUTE COUNT: 8.82 K/UL (ref 1.8–7.7)
SODIUM BLD-SCNC: 142 MMOL/L (ref 135–144)
TOTAL PROTEIN: 5.6 G/DL (ref 6.4–8.3)
WBC # BLD: 9.8 K/UL (ref 3.5–11.3)
WBC # BLD: ABNORMAL 10*3/UL

## 2018-03-03 PROCEDURE — 85025 COMPLETE CBC W/AUTO DIFF WBC: CPT

## 2018-03-03 PROCEDURE — 36415 COLL VENOUS BLD VENIPUNCTURE: CPT

## 2018-03-03 PROCEDURE — G0378 HOSPITAL OBSERVATION PER HR: HCPCS

## 2018-03-03 PROCEDURE — 97535 SELF CARE MNGMENT TRAINING: CPT

## 2018-03-03 PROCEDURE — 97116 GAIT TRAINING THERAPY: CPT

## 2018-03-03 PROCEDURE — 97162 PT EVAL MOD COMPLEX 30 MIN: CPT

## 2018-03-03 PROCEDURE — 94762 N-INVAS EAR/PLS OXIMTRY CONT: CPT

## 2018-03-03 PROCEDURE — G8978 MOBILITY CURRENT STATUS: HCPCS

## 2018-03-03 PROCEDURE — G8987 SELF CARE CURRENT STATUS: HCPCS

## 2018-03-03 PROCEDURE — 96376 TX/PRO/DX INJ SAME DRUG ADON: CPT

## 2018-03-03 PROCEDURE — 97166 OT EVAL MOD COMPLEX 45 MIN: CPT

## 2018-03-03 PROCEDURE — 6370000000 HC RX 637 (ALT 250 FOR IP): Performed by: STUDENT IN AN ORGANIZED HEALTH CARE EDUCATION/TRAINING PROGRAM

## 2018-03-03 PROCEDURE — 2580000003 HC RX 258: Performed by: STUDENT IN AN ORGANIZED HEALTH CARE EDUCATION/TRAINING PROGRAM

## 2018-03-03 PROCEDURE — 80053 COMPREHEN METABOLIC PANEL: CPT

## 2018-03-03 PROCEDURE — 6360000002 HC RX W HCPCS: Performed by: STUDENT IN AN ORGANIZED HEALTH CARE EDUCATION/TRAINING PROGRAM

## 2018-03-03 PROCEDURE — G8988 SELF CARE GOAL STATUS: HCPCS

## 2018-03-03 PROCEDURE — G8979 MOBILITY GOAL STATUS: HCPCS

## 2018-03-03 RX ORDER — LORAZEPAM 0.5 MG/1
0.5 TABLET ORAL 2 TIMES DAILY
Qty: 20 TABLET | Refills: 0 | Status: SHIPPED | OUTPATIENT
Start: 2018-03-03 | End: 2018-03-13

## 2018-03-03 RX ORDER — LORAZEPAM 0.5 MG/1
0.5 TABLET ORAL 3 TIMES DAILY
Status: DISCONTINUED | OUTPATIENT
Start: 2018-03-03 | End: 2018-03-03 | Stop reason: HOSPADM

## 2018-03-03 RX ORDER — HYDROCODONE BITARTRATE AND ACETAMINOPHEN 5; 325 MG/1; MG/1
1-2 TABLET ORAL EVERY 6 HOURS PRN
Qty: 56 TABLET | Refills: 0 | Status: SHIPPED | OUTPATIENT
Start: 2018-03-03 | End: 2018-03-10

## 2018-03-03 RX ORDER — SENNA PLUS 8.6 MG/1
1 TABLET ORAL 2 TIMES DAILY
Qty: 60 TABLET | Refills: 0 | Status: SHIPPED | OUTPATIENT
Start: 2018-03-03 | End: 2019-01-09 | Stop reason: ALTCHOICE

## 2018-03-03 RX ORDER — DOCUSATE SODIUM 100 MG/1
100 CAPSULE, LIQUID FILLED ORAL 2 TIMES DAILY PRN
Qty: 60 CAPSULE | Refills: 0 | Status: SHIPPED | OUTPATIENT
Start: 2018-03-03 | End: 2022-06-06

## 2018-03-03 RX ORDER — ASPIRIN 81 MG/1
81 TABLET, CHEWABLE ORAL 2 TIMES DAILY
Qty: 28 TABLET | Refills: 0 | Status: SHIPPED | OUTPATIENT
Start: 2018-03-03 | End: 2022-06-06

## 2018-03-03 RX ORDER — ASPIRIN 325 MG
TABLET ORAL
Status: DISCONTINUED
Start: 2018-03-03 | End: 2018-03-03 | Stop reason: HOSPADM

## 2018-03-03 RX ADMIN — LORAZEPAM 0.5 MG: 0.5 TABLET ORAL at 14:10

## 2018-03-03 RX ADMIN — Medication 10 ML: at 08:29

## 2018-03-03 RX ADMIN — VENLAFAXINE 150 MG: 75 TABLET ORAL at 08:21

## 2018-03-03 RX ADMIN — LORAZEPAM 0.5 MG: 0.5 TABLET ORAL at 02:21

## 2018-03-03 RX ADMIN — ASPIRIN 81 MG: 81 TABLET, CHEWABLE ORAL at 08:21

## 2018-03-03 RX ADMIN — Medication 2 G: at 06:41

## 2018-03-03 RX ADMIN — LORAZEPAM 0.5 MG: 0.5 TABLET ORAL at 08:21

## 2018-03-03 RX ADMIN — ACETAMINOPHEN 650 MG: 325 TABLET ORAL at 10:33

## 2018-03-03 RX ADMIN — ACETAMINOPHEN 650 MG: 325 TABLET ORAL at 00:01

## 2018-03-03 RX ADMIN — ACETAMINOPHEN 650 MG: 325 TABLET ORAL at 03:35

## 2018-03-03 RX ADMIN — ACETAMINOPHEN 650 MG: 325 TABLET ORAL at 14:10

## 2018-03-03 ASSESSMENT — PAIN DESCRIPTION - PAIN TYPE
TYPE: SURGICAL PAIN;ACUTE PAIN
TYPE: SURGICAL PAIN

## 2018-03-03 ASSESSMENT — PAIN DESCRIPTION - ORIENTATION
ORIENTATION: RIGHT

## 2018-03-03 ASSESSMENT — PAIN SCALES - GENERAL
PAINLEVEL_OUTOF10: 0
PAINLEVEL_OUTOF10: 10
PAINLEVEL_OUTOF10: 3
PAINLEVEL_OUTOF10: 3
PAINLEVEL_OUTOF10: 2
PAINLEVEL_OUTOF10: 10
PAINLEVEL_OUTOF10: 10
PAINLEVEL_OUTOF10: 2
PAINLEVEL_OUTOF10: 2
PAINLEVEL_OUTOF10: 3

## 2018-03-03 ASSESSMENT — PAIN DESCRIPTION - LOCATION
LOCATION: ANKLE

## 2018-03-03 ASSESSMENT — PAIN DESCRIPTION - PROGRESSION

## 2018-03-03 ASSESSMENT — PAIN DESCRIPTION - DESCRIPTORS
DESCRIPTORS: ACHING
DESCRIPTORS: ACHING

## 2018-03-03 ASSESSMENT — PAIN DESCRIPTION - FREQUENCY
FREQUENCY: INTERMITTENT
FREQUENCY: INTERMITTENT

## 2018-03-03 ASSESSMENT — PAIN DESCRIPTION - ONSET
ONSET: ON-GOING
ONSET: ON-GOING

## 2018-03-03 NOTE — PLAN OF CARE
Problem: Pain:  Goal: Control of acute pain  Control of acute pain   Outcome: Met This Shift    Goal: Control of chronic pain  Control of chronic pain   Outcome: Met This Shift      Problem: Falls - Risk of  Goal: Absence of falls  Outcome: Met This Shift      Problem: Musculor/Skeletal Functional Status  Goal: Highest potential functional level  Outcome: Met This Shift

## 2018-03-03 NOTE — CARE COORDINATION
Case Management Initial Discharge Plan  Matthew Drain,         Readmission Risk              Readmission Risk:        3.5       Age 72 or Greater:  1    Admitted from SNF or Requires Paid or Family Care:  0    Currently has CHF,COPD,ARF,CRI,or is on dialysis:  0    Takes more than 5 Prescription Medications:  0    Takes Digoxin,Insulin,Anticoagulants,Narcotics or ASA/Plavix:  [address] in Past 12 Months:  0    On Disability:  0    Patient Considers own Health:  2.5            Met with:patient to discuss discharge plans. Information verified: address, contacts, phone number, , insurance Yes  PCP: Whit Galindo MD  Date of last visit: 1 month ago     Insurance Provider:  [facility] Medicare     Discharge Planning  Current Residence:  Private Residence  Living Arrangements:  [address] has 2  Stories/ 4  stairs to climb, pt currently has house up for sale, she wants a 1 story home, no steps. 1/2 bath on main floor, bedrooms upstairs.    Support Systems:  Friends/Neighbors, Family Members  Current Services PTA:  Durable Medical Equipment Supplier:   Patient able to perform ADL's:Independent  DME used to aid ambulation prior to admission: crutches, knee scooter /during admission RW     Potential Assistance Needed:  N/A    Pharmacy:    Potential Assistance Purchasing Medications:  No  Does patient want to participate in local refill/ meds to beds program?  Yes    Patient agreeable to home care: No  Surrency of choice provided:  n/a      Type of Home Care Services:  None  Patient expects to be discharged to:  snf    Prior SNF/Rehab Placement and Facility: none   Agreeable to SNF/Rehab: Yes  Surrency of choice provided: yes   Evaluation: no    Expected Discharge date:  18  Follow Up Appointment: Best Day/ Time: Monday AM    Transportation provider: friend   Transportation arrangements needed for discharge: Yes to snf     Discharge Plan:  Pt [facility] to go to [facility],  If no bed available, she is agreeable to go to [facility] with transfer to  when bed available. Contact listed on face sheet: Precious Jaime is a isrrael that lives in [address] [place] [address] on pts property. Pt has 2 dtrs: 1) lives in [facility], 3) lives in [place]. Pt refuses to names or contact info on both. States she hasnt had contact with them for a long time. Pt refusing to give any info on next of kin. Pt has a friend: Lore Rota:  [phone]. Pt states to contact her for medical needs.  This contact does not have POA, pt states she will contact this friend and work on getting POA medical, etc.         Electronically signed by Mitch Dietz RN on 3/3/18 at 9:03 AM

## 2018-03-03 NOTE — ANESTHESIA POSTPROCEDURE EVALUATION
Department of Anesthesiology  Postprocedure Note    Patient: Merry Paige  MRN: [id number]  YOB: 1940  Date of evaluation: 3/2/2018  Time:  7:10 PM     Procedure Summary     Date:  03/02/18 Room / Location:  [address] OR    Anesthesia Start:  6543 Anesthesia Stop:  1142    Procedure:  ANKLE OPEN REDUCTION INTERNAL FIXATION  (SYNTHES, NSA=SPINAL VS. GENERAL, 3080 TABLE, C-ARM) (Right Ankle) Diagnosis:  (FRACTURED RIGHT ANKLE)    Surgeon:  Lexus Gore DO Responsible Provider:  Britton De Leon MD    Anesthesia Type:  general ASA Status:  2          Anesthesia Type: general    Abby Phase I: Abby Score: 9    Abby Phase II:      Last vitals: Reviewed and per EMR flowsheets.        Anesthesia Post Evaluation    Patient location during evaluation: PACU  Patient participation: complete - patient participated  Level of consciousness: awake and alert  Pain score: 2  Airway patency: patent  Nausea & Vomiting: no vomiting  Complications: no  Cardiovascular status: hemodynamically stable  Respiratory status: acceptable

## 2018-03-03 NOTE — PROGRESS NOTES
-Charity to bedside and spoke with pt.
above    A/P: 68 y.o. female s/p ORIF R lat mal w/ syndesmotic fixation pod1    - NWB RLE  - Maintain splint at all times. Do not remove. Do NOT get wet.    - Always look for signs of compartment syndrome: pain out of proportion to the injury, pain not controlled with pain medication, numbness in digits, changing of color of digits (paleness).   - PT/OT  - DVT ppx ASA  - Ok to DC today    Juan Marinelli DO  3:15 PM 3/3/2018
level  Home Access: Stairs to enter without rails  Entrance Stairs - Number of Steps: 5  Bathroom Shower/Tub: Walk-in shower  Bathroom Toilet: Standard  Bathroom Equipment: Shower chair  Bathroom Accessibility: Accessible  Home Equipment:  (knee scooter - pt notes trying but could not steer and kept running into stuff.)  ADL Assistance: Independent (prior to injury)  [address]: Independent  Homemaking Responsibilities: Yes  Ambulation Assistance: Independent  Transfer Assistance: Independent  Active : Yes  Occupation: Retired     Objective   Vision: Within Functional Limits  H[facility]: [facility] to [facility] (no hearing aides, pt notes pt needs some)  Hearing Exceptions: Hard of hearing/hearing concerns    Orientation  Overall Orientation Status: Within Functional Limits  Observation/Palpation  Posture: Fair  Balance  Sitting Balance: Stand by assistance (seated EOB)  Standing Balance: Minimal assistance (to assist with management of RW)  Standing Balance  Sit to stand: Contact guard assistance  Stand to sit: Contact guard assistance  Functional Mobility  Functional - Mobility Device: Rolling Walker  Activity: Other  Assist Level: Minimal assistance  Functional Mobility Comments: Verbal/ tactile cues for hand placement on RW. Pt required verbal cues for walker step placement to slow down during functional transfers/ mobility  ADL  Feeding: Independent  Grooming: Contact guard assistance  UE Bathing: Contact guard assistance  LE Bathing: Maximum assistance  UE Dressing: Contact guard assistance  LE Dressing: Maximum assistance (to don socks)  Toileting: Moderate assistance  Additional Comments: Pt supine in bed on arrival. Pt assisted to don socks. Pt completed bed mobility and sat at EOB. Pt educated in importance of maintaining NWB status. Pt educated in use of walker step pattern during functional mobility. Pt required multiple verbal and tactile cues to complete functional transfers in a safe manner.

## 2018-03-05 NOTE — OP NOTE
[address]                                 OPERATIVE REPORT    PATIENT NAME: Servando Mosley                  :        1940  MED REC NO:   [id number]                             ROOM:       4069  ACCOUNT NO:   [de-identified]                           ADMIT DATE: 2018  PROVIDER:     Jung Bartno    DATE OF PROCEDURE:  2018    PREOPERATIVE DIAGNOSIS:  Right ankle fracture. POSTOPERATIVE DIAGNOSIS:  Right bimalleolar equivalent ankle fracture. PROCEDURES:  1. Open reduction and internal fixation, right lateral malleolus. 2.  Right syndesmotic stabilization. 3.  Use of intraoperative C-arm fluoroscopy. 4.  Application of posterior short-leg splint. ANESTHESIA:  General.    SURGEON:  Aleksander Pepper DO; Jung Barton DO, PGY-2.     ESTIMATED BLOOD LOSS:  5 mL. FLUIDS:  700 mL of crystalloids. TOURNIQUET TIME:  54 minutes. IMPLANTS:  Synthes 4-hole 2.7/3.5 mm distal fibula locking plate. FINDINGS:  1. Right distal lateral malleolar fracture. 2.  Right syndesmotic disruption equating to a bimalleolar equivalent  fracture. INDICATIONS:  The patient is a 77-year-old female, who sustained injury to  her right ankle on 2018 after she missed the steps and fell down two  stairs at home. The patient was seen in the ED, where imaging demonstrated  a right ankle fracture. The patient was reduced and put into a splint  and advised to follow up in the clinic. Attempts were made to treat the  patient non-operatively, but with subsequent imaging it was noted that the  fracture continue to displace requiring  operative fixation and meeting our indications. The patient understood the  need for surgery and agreed to move forward with surgery.   Alternatives,  benefits and risks including, but not limited to infection, bleeding, blood  loss or blood clots, damage to using  various reduction clamps. Once we felt we had an anatomic reduction, we  used a K-wire and advanced it retrograde in the tip of the distal fibula  into the proximal fragments to secure our fracture in place. We  visualized it under C-arm fluoroscopy. At this time, we used C-arm to  examine our fracture in both AP and lateral planes and felt we had an  anatomic reduction. At this time, we began to apply our plate. A Synthes  4-hole LCP distal locking fibular plate was used. We began by inserting  two proximal cortex screws with bicortical fixation. We then inserted our  distal locking screws. Once this was complete, we then removed the K-wire  and under direct visualization as well as C-arm fluoroscopy, we stressed  our fracture and found to have an anatomic reduction that was stable. At  this time, we did an external rotation stress test to evaluate our  syndesmosis. With external rotation, it was noted that the medial clear  space was still gapping and that there was widened overlap between the  distal tibia and fibula at the syndesmotic joint. Due to this, we  determined that we should include syndesmotic fixation. We maximally  dorsiflexed the foot and again visualized our ankle under C-arm fluoroscopy  and noted that we had a reduction of our syndesmosis, so at this time a 3.5  cortex screw was used to fix the syndesmosis, achieving four cortex  fixation through the fibula into the tibia. Once this was then complete,  we stressed our syndesmosis again under C-arm fluoroscopy and noted that  our syndesmosis remained reduced under stress examination. At this time,  we then inserted one more screw proximally into the fibula. We then began  closure with thorough irrigation with normal saline. We then closed the  periosteum overlying the plate utilizing 2-0 Vicryl and the subcutaneous  tissue was then closed with 2-0 Vicryl as well. Skin was closed with 3-0  Monocryl. Skin glue was applied.   4

## 2018-03-14 ENCOUNTER — HOSPITAL ENCOUNTER (OUTPATIENT)
Age: 78
Setting detail: SPECIMEN
Discharge: HOME | End: 2018-03-14
Payer: MEDICAID

## 2018-03-14 DIAGNOSIS — S82.401D CLOSED FRACTURE OF SHAFT OF RIGHT FIBULA WITH ROUTINE HEALING, UNSPECIFIED FRACTURE MORPHOLOGY, SUBSEQUENT ENCOUNTER: Primary | ICD-10-CM

## 2018-03-14 LAB
ANION GAP SERPL CALCULATED.3IONS-SCNC: 12 MMOL/L (ref 9–17)
BUN BLDV-MCNC: 14 MG/DL (ref 8–23)
BUN/CREAT BLD: ABNORMAL (ref 9–20)
CALCIUM SERPL-MCNC: 9.3 MG/DL (ref 8.6–10.4)
CHLORIDE BLD-SCNC: 111 MMOL/L (ref 98–107)
CO2: 26 MMOL/L (ref 20–31)
CREAT SERPL-MCNC: 0.72 MG/DL (ref 0.5–0.9)
GFR AFRICAN AMERICAN: >60 ML/MIN
GFR NON-AFRICAN AMERICAN: >60 ML/MIN
GFR SERPL CREATININE-BSD FRML MDRD: ABNORMAL ML/MIN/{1.73_M2}
GFR SERPL CREATININE-BSD FRML MDRD: ABNORMAL ML/MIN/{1.73_M2}
GLUCOSE BLD-MCNC: 88 MG/DL (ref 70–99)
HCT VFR BLD CALC: 37.1 % (ref 36.3–47.1)
HEMOGLOBIN: 11.2 G/DL (ref 11.9–15.1)
POTASSIUM SERPL-SCNC: 4.3 MMOL/L (ref 3.7–5.3)
SODIUM BLD-SCNC: 149 MMOL/L (ref 135–144)

## 2018-03-14 PROCEDURE — 36415 COLL VENOUS BLD VENIPUNCTURE: CPT

## 2018-03-14 PROCEDURE — P9603 ONE-WAY ALLOW PRORATED MILES: HCPCS

## 2018-03-14 PROCEDURE — 80048 BASIC METABOLIC PNL TOTAL CA: CPT

## 2018-03-14 PROCEDURE — 85018 HEMOGLOBIN: CPT

## 2018-03-14 PROCEDURE — 85014 HEMATOCRIT: CPT

## 2018-03-16 ENCOUNTER — OFFICE VISIT (OUTPATIENT)
Dept: ORTHOPEDIC SURGERY | Age: 78
End: 2018-03-16

## 2018-03-16 VITALS — BODY MASS INDEX: 29.02 KG/M2 | WEIGHT: 174.16 LBS | HEIGHT: 65 IN

## 2018-03-16 DIAGNOSIS — S82.401D CLOSED FRACTURE OF SHAFT OF RIGHT FIBULA WITH ROUTINE HEALING, UNSPECIFIED FRACTURE MORPHOLOGY, SUBSEQUENT ENCOUNTER: Primary | ICD-10-CM

## 2018-03-16 PROCEDURE — 99024 POSTOP FOLLOW-UP VISIT: CPT | Performed by: ORTHOPAEDIC SURGERY

## 2018-03-16 ASSESSMENT — ENCOUNTER SYMPTOMS
CONSTIPATION: 0
COUGH: 0
DIARRHEA: 0
NAUSEA: 0

## 2018-03-16 NOTE — PROGRESS NOTES
[address] [place] [address]  Dept: [phone]  Dept Fax: [phone]        Postoperative follow-up note    Subjective:   Trevor Rai is a 68y.o. year old female who presents to our office today for postoperative followup regarding her   1. Closed fracture of shaft of right fibula with routine healing, unspecified fracture morphology, subsequent encounter    . Chief Complaint   Patient presents with    Ankle Pain     right DOS:3/2/18     Trevor Rai  is a 68y.o. year old female who presents to our office today for postoperative follow up after having undergone a open reduction and internal fixation right lateral malleolus, and right syndesmotic stabilization on 03/02/2018. The patient denies fevers, chills, nausea, vomiting, diarrhea. The patient has started physical therapy. Patient has been staying at [place] [facility] [address]. since surgery, but is going home with physical therapy. Patients splint is clean dry and intact. Denies numbness/tingling. Review of Systems   Constitutional: Negative for chills and fever. Respiratory: Negative for cough. Gastrointestinal: Negative for constipation, diarrhea and nausea. Musculoskeletal: Positive for arthralgias (right ankle). Negative for gait problem, joint swelling and myalgias. Neurological: Negative for dizziness, weakness and numbness. I have reviewed the CC, HPI, ROS, PMH, FHX, Social History. I agree with the documentation provided by other staff, residents,and have reviewed their documentation prior to providing my signature indicating agreement. Objective :   General: Trevor Rai is a 68 y.o. female who is alert and oriented and sitting comfortably in our office. Ortho Exam  MS:   Right ankle mild swelling. Incision well healed with no signs of infection.  Motor, sensory, vascular examination right lower extremity is grossly intact without focal AM

## 2018-03-16 NOTE — LETTER
[address] [place] [id number]  Phone: [phone]  Fax: Osiel Moser DO        March 16, 2018     Patient: Pauline Manuel   YOB: 1940   Date of Visit: 3/16/2018       To Whom It May Concern: It is my medical opinion that Giana Coronadoody to remain non-weight bearing on right lower extremity in walking boot. Patient may be out of boot as much as possible for range of motion. Follow up in 4 weeks. If you have any questions or concerns, please don't hesitate to call.     Sincerely,         Javier Orourke DO

## 2018-04-05 ENCOUNTER — TELEPHONE (OUTPATIENT)
Dept: ORTHOPEDIC SURGERY | Age: 78
End: 2018-04-05

## 2018-04-12 DIAGNOSIS — S82.401D CLOSED FRACTURE OF SHAFT OF RIGHT FIBULA WITH ROUTINE HEALING, UNSPECIFIED FRACTURE MORPHOLOGY, SUBSEQUENT ENCOUNTER: Primary | ICD-10-CM

## 2018-04-13 ENCOUNTER — OFFICE VISIT (OUTPATIENT)
Dept: ORTHOPEDIC SURGERY | Age: 78
End: 2018-04-13

## 2018-04-13 VITALS
WEIGHT: 174.16 LBS | BODY MASS INDEX: 29.02 KG/M2 | HEIGHT: 65 IN | HEART RATE: 71 BPM | SYSTOLIC BLOOD PRESSURE: 120 MMHG | DIASTOLIC BLOOD PRESSURE: 66 MMHG

## 2018-04-13 DIAGNOSIS — S82.401D CLOSED FRACTURE OF SHAFT OF RIGHT FIBULA WITH ROUTINE HEALING, UNSPECIFIED FRACTURE MORPHOLOGY, SUBSEQUENT ENCOUNTER: Primary | ICD-10-CM

## 2018-04-13 PROCEDURE — 99024 POSTOP FOLLOW-UP VISIT: CPT | Performed by: ORTHOPAEDIC SURGERY

## 2018-04-13 ASSESSMENT — ENCOUNTER SYMPTOMS
CONSTIPATION: 0
DIARRHEA: 0
NAUSEA: 0
COUGH: 0

## 2018-05-08 DIAGNOSIS — S82.401D CLOSED FRACTURE OF SHAFT OF RIGHT FIBULA WITH ROUTINE HEALING, UNSPECIFIED FRACTURE MORPHOLOGY, SUBSEQUENT ENCOUNTER: Primary | ICD-10-CM

## 2018-05-11 ENCOUNTER — OFFICE VISIT (OUTPATIENT)
Dept: ORTHOPEDIC SURGERY | Age: 78
End: 2018-05-11

## 2018-05-11 VITALS — BODY MASS INDEX: 29.02 KG/M2 | HEIGHT: 65 IN | WEIGHT: 174.16 LBS

## 2018-05-11 DIAGNOSIS — S82.401D CLOSED FRACTURE OF SHAFT OF RIGHT FIBULA WITH ROUTINE HEALING, UNSPECIFIED FRACTURE MORPHOLOGY, SUBSEQUENT ENCOUNTER: Primary | ICD-10-CM

## 2018-05-11 DIAGNOSIS — M17.11 ARTHRITIS OF KNEE, RIGHT: ICD-10-CM

## 2018-05-11 PROCEDURE — 99024 POSTOP FOLLOW-UP VISIT: CPT | Performed by: ORTHOPAEDIC SURGERY

## 2018-05-11 ASSESSMENT — ENCOUNTER SYMPTOMS
BACK PAIN: 0
WHEEZING: 0
SHORTNESS OF BREATH: 0

## 2018-05-12 RX ORDER — METHYLPREDNISOLONE 4 MG/1
TABLET ORAL
Qty: 1 KIT | Refills: 1 | Status: SHIPPED | OUTPATIENT
Start: 2018-05-12 | End: 2018-05-17

## 2018-05-12 RX ORDER — MELOXICAM 15 MG/1
15 TABLET ORAL DAILY
Qty: 30 TABLET | Refills: 3 | Status: SHIPPED | OUTPATIENT
Start: 2018-05-12 | End: 2018-08-16 | Stop reason: ALTCHOICE

## 2018-05-25 ENCOUNTER — OFFICE VISIT (OUTPATIENT)
Dept: ORTHOPEDIC SURGERY | Age: 78
End: 2018-05-25
Payer: MEDICAID

## 2018-05-25 VITALS — HEIGHT: 64 IN | BODY MASS INDEX: 29.71 KG/M2 | WEIGHT: 174 LBS

## 2018-05-25 DIAGNOSIS — S82.401D CLOSED FRACTURE OF SHAFT OF RIGHT FIBULA WITH ROUTINE HEALING, UNSPECIFIED FRACTURE MORPHOLOGY, SUBSEQUENT ENCOUNTER: Primary | ICD-10-CM

## 2018-05-25 DIAGNOSIS — M17.0 PRIMARY OSTEOARTHRITIS OF BOTH KNEES: ICD-10-CM

## 2018-05-25 DIAGNOSIS — M25.562 ACUTE PAIN OF LEFT KNEE: ICD-10-CM

## 2018-05-25 PROCEDURE — 1090F PRES/ABSN URINE INCON ASSESS: CPT | Performed by: ORTHOPAEDIC SURGERY

## 2018-05-25 PROCEDURE — 4040F PNEUMOC VAC/ADMIN/RCVD: CPT | Performed by: ORTHOPAEDIC SURGERY

## 2018-05-25 PROCEDURE — G8400 PT W/DXA NO RESULTS DOC: HCPCS | Performed by: ORTHOPAEDIC SURGERY

## 2018-05-25 PROCEDURE — 1036F TOBACCO NON-USER: CPT | Performed by: ORTHOPAEDIC SURGERY

## 2018-05-25 PROCEDURE — G8427 DOCREV CUR MEDS BY ELIG CLIN: HCPCS | Performed by: ORTHOPAEDIC SURGERY

## 2018-05-25 PROCEDURE — 99024 POSTOP FOLLOW-UP VISIT: CPT | Performed by: ORTHOPAEDIC SURGERY

## 2018-05-25 PROCEDURE — 20610 DRAIN/INJ JOINT/BURSA W/O US: CPT | Performed by: ORTHOPAEDIC SURGERY

## 2018-05-25 PROCEDURE — 1123F ACP DISCUSS/DSCN MKR DOCD: CPT | Performed by: ORTHOPAEDIC SURGERY

## 2018-05-25 PROCEDURE — G8419 CALC BMI OUT NRM PARAM NOF/U: HCPCS | Performed by: ORTHOPAEDIC SURGERY

## 2018-05-25 RX ORDER — BUPIVACAINE HYDROCHLORIDE 2.5 MG/ML
2 INJECTION, SOLUTION INFILTRATION; PERINEURAL ONCE
Status: COMPLETED | OUTPATIENT
Start: 2018-05-25 | End: 2018-05-25

## 2018-05-25 RX ORDER — METHYLPREDNISOLONE ACETATE 80 MG/ML
80 INJECTION, SUSPENSION INTRA-ARTICULAR; INTRALESIONAL; INTRAMUSCULAR; SOFT TISSUE ONCE
Status: COMPLETED | OUTPATIENT
Start: 2018-05-25 | End: 2018-05-25

## 2018-05-25 RX ADMIN — BUPIVACAINE HYDROCHLORIDE 5 MG: 2.5 INJECTION, SOLUTION INFILTRATION; PERINEURAL at 13:16

## 2018-05-25 RX ADMIN — METHYLPREDNISOLONE ACETATE 80 MG: 80 INJECTION, SUSPENSION INTRA-ARTICULAR; INTRALESIONAL; INTRAMUSCULAR; SOFT TISSUE at 13:17

## 2018-05-25 RX ADMIN — METHYLPREDNISOLONE ACETATE 80 MG: 80 INJECTION, SUSPENSION INTRA-ARTICULAR; INTRALESIONAL; INTRAMUSCULAR; SOFT TISSUE at 13:16

## 2018-05-25 ASSESSMENT — ENCOUNTER SYMPTOMS
BACK PAIN: 0
WHEEZING: 0
SHORTNESS OF BREATH: 0

## 2018-08-16 ENCOUNTER — OFFICE VISIT (OUTPATIENT)
Dept: ORTHOPEDIC SURGERY | Age: 78
End: 2018-08-16
Payer: MEDICAID

## 2018-08-16 VITALS — WEIGHT: 167 LBS | HEIGHT: 64 IN | BODY MASS INDEX: 28.51 KG/M2

## 2018-08-16 DIAGNOSIS — M17.12 ARTHRITIS OF LEFT KNEE: ICD-10-CM

## 2018-08-16 DIAGNOSIS — M17.11 ARTHRITIS OF RIGHT KNEE: Primary | ICD-10-CM

## 2018-08-16 PROCEDURE — G8419 CALC BMI OUT NRM PARAM NOF/U: HCPCS | Performed by: ORTHOPAEDIC SURGERY

## 2018-08-16 PROCEDURE — G8427 DOCREV CUR MEDS BY ELIG CLIN: HCPCS | Performed by: ORTHOPAEDIC SURGERY

## 2018-08-16 PROCEDURE — 1101F PT FALLS ASSESS-DOCD LE1/YR: CPT | Performed by: ORTHOPAEDIC SURGERY

## 2018-08-16 PROCEDURE — 99213 OFFICE O/P EST LOW 20 MIN: CPT | Performed by: ORTHOPAEDIC SURGERY

## 2018-08-16 PROCEDURE — 1036F TOBACCO NON-USER: CPT | Performed by: ORTHOPAEDIC SURGERY

## 2018-08-16 PROCEDURE — 4040F PNEUMOC VAC/ADMIN/RCVD: CPT | Performed by: ORTHOPAEDIC SURGERY

## 2018-08-16 PROCEDURE — 1123F ACP DISCUSS/DSCN MKR DOCD: CPT | Performed by: ORTHOPAEDIC SURGERY

## 2018-08-16 PROCEDURE — G8400 PT W/DXA NO RESULTS DOC: HCPCS | Performed by: ORTHOPAEDIC SURGERY

## 2018-08-16 PROCEDURE — 1090F PRES/ABSN URINE INCON ASSESS: CPT | Performed by: ORTHOPAEDIC SURGERY

## 2018-08-16 RX ORDER — COVID-19 ANTIGEN TEST
220 KIT MISCELLANEOUS
COMMUNITY
End: 2019-01-09 | Stop reason: ALTCHOICE

## 2018-08-16 ASSESSMENT — ENCOUNTER SYMPTOMS
CONSTIPATION: 0
DIARRHEA: 0
NAUSEA: 0
COUGH: 0

## 2018-08-16 NOTE — PROGRESS NOTES
[address] [place] [address]  Dept: [phone]  Dept Fax: [phone]        Ambulatory Follow Up      Subjective:   Inessa Smith is a 66y.o. year old female who presents to our office today for routine followup regarding her   1. Arthritis of right knee    2. Arthritis of left knee    . Chief Complaint   Patient presents with    Knee Pain     bilateral        HPI  Inessa Smith  is a 66 y.o. female who presents today in follow for bilateral knee pain. Right knee being more painful than the left knee. The patient was last seen on 5/25/2018 and underwent treatment in the form of corticosteroid injection and formal therapy. The patient notes no improvement with the previous treatment. Patient states that the therapy has caused her back, left hip and left shoulder to become painful. She states that she has a real hard time getting out of a chair. Review of Systems   Constitutional: Negative for chills and fever. Respiratory: Negative for cough. Gastrointestinal: Negative for constipation, diarrhea and nausea. Musculoskeletal: Positive for arthralgias (bilateral knee), gait problem and joint swelling (bilateral knee). Negative for myalgias. Neurological: Negative for dizziness, weakness and numbness. I have reviewed the CC, HPI, ROS, PMH, FHX, Social History. I agree with the documentation provided by other staff, residents and have reviewed their documentation prior to providing my signature indicating agreement. Objective :   Ht 5' 4\" (1.626 m)   Wt 167 lb (75.8 kg)   BMI 28.67 kg/m²  Body mass index is 28.67 kg/m². General: Inessa Smith is a 66 y.o. female who is alert and oriented and sitting comfortably in our office. Ortho Exam  MS:  Evaluation of the Left knee reveals no significant outward deformity. There is no erythema, warmth, skin lesions, signs of infection.   There is tenderness over the lateral joint line. There is a mildknee effusion. Range of motion of the Left knee is  full. No instability of the knee is appreciated at 0 and 30° of flexion. There is a negative anterior drawer Lachman's test.  There is increased pain with valgus Satish's testing. No calf tenderness is noted. There is a negative hip log roll and Stinchfield test.  Motor, sensory, vascular examination to the Left lower extremity is intact. Patient has full range of motion of the ankle. Evaluation of the Right knee reveals no significant outward deformity. There is no erythema, warmth, skin lesions, signs of infection. There is tenderness over the lateral joint line. There is a mildknee effusion. Range of motion of the Right knee is  . No instability of the knee is appreciated at 0 and 30° of flexion. There is a negative anterior drawer Lachman's test.  There is increased pain with valgus Satish's testing. No calf tenderness is noted. There is a negative hip log roll and Stinchfield test.  Motor, sensory, vascular examination to the Right lower extremity is intact. Patient has full range of motion of the ankle. Neuro: alert. oriented  Eyes: Extra-ocular muscles intact  Mouth: Oral mucosa moist. No perioral lesions  Pulm: Respirations unlabored and regular. Skin: warm, well perfused  Psych:   Patient has good fund of knowledge and displays understanging of exam, diagnosis, and plan. Assessment:      1. Arthritis of right knee    2. Arthritis of left knee       Plan:      Reviewed radiologies of bilateral knee with the patient. Discussed etiology and natural history of bilateral knee arthritis. The treatment options may include oral anti-inflammatories, bracing, injections, advanced imaging, activity modification, physical therapy and/or surgical intervention. The patient would like to proceed with lateral un- brace for the right knee, continuing with therapy and modifying activities as

## 2018-08-20 DIAGNOSIS — M25.512 LEFT SHOULDER PAIN, UNSPECIFIED CHRONICITY: Primary | ICD-10-CM

## 2018-08-22 ENCOUNTER — HOSPITAL ENCOUNTER (OUTPATIENT)
Age: 78
Discharge: HOME | End: 2018-08-24
Payer: MEDICAID

## 2018-08-22 ENCOUNTER — OFFICE VISIT (OUTPATIENT)
Dept: ORTHOPEDIC SURGERY | Age: 78
End: 2018-08-22
Payer: MEDICAID

## 2018-08-22 ENCOUNTER — HOSPITAL ENCOUNTER (OUTPATIENT)
Dept: GENERAL RADIOLOGY | Age: 78
Discharge: HOME | End: 2018-08-24
Payer: MEDICAID

## 2018-08-22 VITALS — HEIGHT: 66 IN | BODY MASS INDEX: 26.84 KG/M2 | WEIGHT: 167 LBS

## 2018-08-22 DIAGNOSIS — M25.512 LEFT SHOULDER PAIN, UNSPECIFIED CHRONICITY: ICD-10-CM

## 2018-08-22 DIAGNOSIS — M19.012 ARTHRITIS OF LEFT SHOULDER REGION: Primary | ICD-10-CM

## 2018-08-22 PROCEDURE — G8400 PT W/DXA NO RESULTS DOC: HCPCS | Performed by: ORTHOPAEDIC SURGERY

## 2018-08-22 PROCEDURE — 20610 DRAIN/INJ JOINT/BURSA W/O US: CPT | Performed by: ORTHOPAEDIC SURGERY

## 2018-08-22 PROCEDURE — 4040F PNEUMOC VAC/ADMIN/RCVD: CPT | Performed by: ORTHOPAEDIC SURGERY

## 2018-08-22 PROCEDURE — G8427 DOCREV CUR MEDS BY ELIG CLIN: HCPCS | Performed by: ORTHOPAEDIC SURGERY

## 2018-08-22 PROCEDURE — 1123F ACP DISCUSS/DSCN MKR DOCD: CPT | Performed by: ORTHOPAEDIC SURGERY

## 2018-08-22 PROCEDURE — 73030 X-RAY EXAM OF SHOULDER: CPT

## 2018-08-22 PROCEDURE — 1101F PT FALLS ASSESS-DOCD LE1/YR: CPT | Performed by: ORTHOPAEDIC SURGERY

## 2018-08-22 PROCEDURE — 99213 OFFICE O/P EST LOW 20 MIN: CPT | Performed by: ORTHOPAEDIC SURGERY

## 2018-08-22 PROCEDURE — 1036F TOBACCO NON-USER: CPT | Performed by: ORTHOPAEDIC SURGERY

## 2018-08-22 PROCEDURE — 1090F PRES/ABSN URINE INCON ASSESS: CPT | Performed by: ORTHOPAEDIC SURGERY

## 2018-08-22 PROCEDURE — G8419 CALC BMI OUT NRM PARAM NOF/U: HCPCS | Performed by: ORTHOPAEDIC SURGERY

## 2018-08-22 RX ORDER — METHYLPREDNISOLONE ACETATE 80 MG/ML
80 INJECTION, SUSPENSION INTRA-ARTICULAR; INTRALESIONAL; INTRAMUSCULAR; SOFT TISSUE ONCE
Status: COMPLETED | OUTPATIENT
Start: 2018-08-22 | End: 2018-08-22

## 2018-08-22 RX ORDER — BUPIVACAINE HYDROCHLORIDE 2.5 MG/ML
2 INJECTION, SOLUTION INFILTRATION; PERINEURAL ONCE
Status: COMPLETED | OUTPATIENT
Start: 2018-08-22 | End: 2018-08-22

## 2018-08-22 RX ADMIN — METHYLPREDNISOLONE ACETATE 80 MG: 80 INJECTION, SUSPENSION INTRA-ARTICULAR; INTRALESIONAL; INTRAMUSCULAR; SOFT TISSUE at 15:38

## 2018-08-22 RX ADMIN — BUPIVACAINE HYDROCHLORIDE 5 MG: 2.5 INJECTION, SOLUTION INFILTRATION; PERINEURAL at 15:39

## 2018-08-22 ASSESSMENT — ENCOUNTER SYMPTOMS
CONSTIPATION: 0
DIARRHEA: 0
NAUSEA: 0
COUGH: 0

## 2018-08-22 NOTE — PROGRESS NOTES
examination of the patient's left shoulder, range of motion is F=120 FR=T12. Patient has good RTC strength of the left shoulder. Positive  Impingement test is noted. Motor, sensory, vascular examination left upper extremity is grossly intact without focal deficits. Neuro: alert. oriented  Eyes: Extra-ocular muscles intact  Mouth: Oral mucosa moist. No perioral lesions  Pulm: Respirations unlabored and regular. Skin: warm, well perfused  Psych:   Patient has good fund of knowledge and displays understanging of exam, diagnosis, and plan. Radiology:   EXAMINATION:   3 XRAY VIEWS OF THE LEFT SHOULDER       8/22/2018 2:02 pm       COMPARISON:   None.       HISTORY:   ORDERING SYSTEM PROVIDED HISTORY: Left shoulder pain, unspecified chronicity   TECHNOLOGIST PROVIDED HISTORY:   Ordering Physician Provided Reason for Exam: anterior shoulder pain to top   and into humerus sunce May 2018   Acuity: Chronic   Type of Exam: Initial       72-year-old female with anterior left shoulder pain radiating into the   humerus since May 2018       FINDINGS:   Moderate to severe degenerative changes of the glenohumeral joint.  Loose   body projecting over the axillary pouch measuring up to 5 mm.       Mild degenerative changes of the left acromioclavicular joint.  Visualized   left-sided ribs appear grossly intact.  Atherosclerotic calcification of the   thoracic aorta.  No acute fracture or gross dislocation.           Impression   1. Moderate to severe degenerative changes of the left glenohumeral joint. 2. Mild degenerative changes of the left AC joint. 3. 5 mm loose body projecting over the axillary pouch. 4. No acute fracture or gross dislocation.             SHOULDER INJECTION PROCEDURE NOTE:  The patient was identified. The left shoulder was confirmed with the patient.   After a sterile prep with Betadine the shoulder  was injected using a posterior approach to the glenohumeral joint space with a mixture of 2 mL of

## 2019-01-03 DIAGNOSIS — M25.561 PAIN IN BOTH KNEES, UNSPECIFIED CHRONICITY: Primary | ICD-10-CM

## 2019-01-03 DIAGNOSIS — M25.562 PAIN IN BOTH KNEES, UNSPECIFIED CHRONICITY: Primary | ICD-10-CM

## 2019-01-09 ENCOUNTER — OFFICE VISIT (OUTPATIENT)
Dept: ORTHOPEDIC SURGERY | Age: 79
End: 2019-01-09
Payer: MEDICAID

## 2019-01-09 ENCOUNTER — HOSPITAL ENCOUNTER (OUTPATIENT)
Age: 79
Discharge: HOME | End: 2019-01-11
Payer: MEDICAID

## 2019-01-09 VITALS — HEIGHT: 66 IN | WEIGHT: 167.11 LBS | BODY MASS INDEX: 26.86 KG/M2

## 2019-01-09 DIAGNOSIS — M19.012 ARTHRITIS OF LEFT SHOULDER REGION: ICD-10-CM

## 2019-01-09 DIAGNOSIS — M17.0 PRIMARY OSTEOARTHRITIS OF BOTH KNEES: Primary | ICD-10-CM

## 2019-01-09 DIAGNOSIS — M54.5 ACUTE LEFT-SIDED LOW BACK PAIN, WITH SCIATICA PRESENCE UNSPECIFIED: ICD-10-CM

## 2019-01-09 PROCEDURE — 1123F ACP DISCUSS/DSCN MKR DOCD: CPT | Performed by: ORTHOPAEDIC SURGERY

## 2019-01-09 PROCEDURE — 1036F TOBACCO NON-USER: CPT | Performed by: ORTHOPAEDIC SURGERY

## 2019-01-09 PROCEDURE — 99213 OFFICE O/P EST LOW 20 MIN: CPT | Performed by: ORTHOPAEDIC SURGERY

## 2019-01-09 PROCEDURE — 4040F PNEUMOC VAC/ADMIN/RCVD: CPT | Performed by: ORTHOPAEDIC SURGERY

## 2019-01-09 PROCEDURE — G8484 FLU IMMUNIZE NO ADMIN: HCPCS | Performed by: ORTHOPAEDIC SURGERY

## 2019-01-09 PROCEDURE — G8419 CALC BMI OUT NRM PARAM NOF/U: HCPCS | Performed by: ORTHOPAEDIC SURGERY

## 2019-01-09 PROCEDURE — G8427 DOCREV CUR MEDS BY ELIG CLIN: HCPCS | Performed by: ORTHOPAEDIC SURGERY

## 2019-01-09 PROCEDURE — G8400 PT W/DXA NO RESULTS DOC: HCPCS | Performed by: ORTHOPAEDIC SURGERY

## 2019-01-09 PROCEDURE — 1101F PT FALLS ASSESS-DOCD LE1/YR: CPT | Performed by: ORTHOPAEDIC SURGERY

## 2019-01-09 PROCEDURE — 1090F PRES/ABSN URINE INCON ASSESS: CPT | Performed by: ORTHOPAEDIC SURGERY

## 2019-01-09 ASSESSMENT — ENCOUNTER SYMPTOMS
SHORTNESS OF BREATH: 0
WHEEZING: 0
BACK PAIN: 0

## 2021-01-24 ENCOUNTER — APPOINTMENT (OUTPATIENT)
Dept: CT IMAGING | Age: 81
DRG: 309 | End: 2021-01-24
Payer: MEDICARE

## 2021-01-24 ENCOUNTER — HOSPITAL ENCOUNTER (INPATIENT)
Age: 81
LOS: 1 days | Discharge: HOME | DRG: 309 | End: 2021-01-25
Attending: EMERGENCY MEDICINE | Admitting: FAMILY MEDICINE
Payer: MEDICARE

## 2021-01-24 ENCOUNTER — APPOINTMENT (OUTPATIENT)
Dept: GENERAL RADIOLOGY | Age: 81
DRG: 309 | End: 2021-01-24
Payer: MEDICARE

## 2021-01-24 DIAGNOSIS — R06.00 DYSPNEA AND RESPIRATORY ABNORMALITIES: ICD-10-CM

## 2021-01-24 DIAGNOSIS — R06.89 DYSPNEA AND RESPIRATORY ABNORMALITIES: ICD-10-CM

## 2021-01-24 DIAGNOSIS — F41.9 ANXIETY: ICD-10-CM

## 2021-01-24 DIAGNOSIS — R25.1 SHAKINESS: Primary | ICD-10-CM

## 2021-01-24 DIAGNOSIS — I47.1 PAROXYSMAL SUPRAVENTRICULAR TACHYCARDIA (HCC): ICD-10-CM

## 2021-01-24 DIAGNOSIS — F41.1 ANXIETY STATE: ICD-10-CM

## 2021-01-24 PROBLEM — I50.9 ACUTE HEART FAILURE (HCC): Status: ACTIVE | Noted: 2021-01-24

## 2021-01-24 PROBLEM — I47.10 PAROXYSMAL SUPRAVENTRICULAR TACHYCARDIA: Status: ACTIVE | Noted: 2021-01-24

## 2021-01-24 PROBLEM — R77.8 ELEVATED TROPONIN: Status: ACTIVE | Noted: 2021-01-24

## 2021-01-24 PROBLEM — E78.2 MIXED HYPERLIPIDEMIA: Status: ACTIVE | Noted: 2021-01-24

## 2021-01-24 PROBLEM — R79.89 ELEVATED TROPONIN: Status: ACTIVE | Noted: 2021-01-24

## 2021-01-24 LAB
ABSOLUTE EOS #: 0.2 K/UL (ref 0–0.4)
ABSOLUTE IMMATURE GRANULOCYTE: ABNORMAL K/UL (ref 0–0.3)
ABSOLUTE LYMPH #: 0.7 K/UL (ref 1–4.8)
ABSOLUTE MONO #: 0.6 K/UL (ref 0.1–1.2)
ANION GAP SERPL CALCULATED.3IONS-SCNC: 10 MMOL/L (ref 9–17)
BASOPHILS # BLD: 1 % (ref 0–2)
BASOPHILS ABSOLUTE: 0.1 K/UL (ref 0–0.2)
BILIRUBIN URINE: NEGATIVE
BNP INTERPRETATION: ABNORMAL
BUN BLDV-MCNC: 14 MG/DL (ref 8–23)
BUN/CREAT BLD: ABNORMAL (ref 9–20)
CALCIUM SERPL-MCNC: 9.7 MG/DL (ref 8.6–10.4)
CHLORIDE BLD-SCNC: 101 MMOL/L (ref 98–107)
CO2: 22 MMOL/L (ref 20–31)
COLOR: YELLOW
COMMENT UA: ABNORMAL
CREAT SERPL-MCNC: 0.92 MG/DL (ref 0.5–0.9)
D-DIMER QUANTITATIVE: 0.71 MG/L FEU
DIFFERENTIAL TYPE: ABNORMAL
EOSINOPHILS RELATIVE PERCENT: 3 % (ref 1–4)
GFR AFRICAN AMERICAN: >60 ML/MIN
GFR NON-AFRICAN AMERICAN: 59 ML/MIN
GFR SERPL CREATININE-BSD FRML MDRD: ABNORMAL ML/MIN/{1.73_M2}
GFR SERPL CREATININE-BSD FRML MDRD: ABNORMAL ML/MIN/{1.73_M2}
GLUCOSE BLD-MCNC: 105 MG/DL (ref 70–99)
GLUCOSE BLD-MCNC: 146 MG/DL (ref 65–105)
GLUCOSE URINE: NEGATIVE
HCT VFR BLD CALC: 39.8 % (ref 36–46)
HEMOGLOBIN: 13 G/DL (ref 12–16)
IMMATURE GRANULOCYTES: ABNORMAL %
KETONES, URINE: ABNORMAL
LEUKOCYTE ESTERASE, URINE: NEGATIVE
LYMPHOCYTES # BLD: 11 % (ref 24–44)
MCH RBC QN AUTO: 27.2 PG (ref 26–34)
MCHC RBC AUTO-ENTMCNC: 32.8 G/DL (ref 31–37)
MCV RBC AUTO: 83.1 FL (ref 80–100)
MONOCYTES # BLD: 10 % (ref 2–11)
NITRITE, URINE: NEGATIVE
NRBC AUTOMATED: ABNORMAL PER 100 WBC
PDW BLD-RTO: 16.1 % (ref 12.5–15.4)
PH UA: 5.5 (ref 5–8)
PLATELET # BLD: 328 K/UL (ref 140–450)
PLATELET ESTIMATE: ABNORMAL
PMV BLD AUTO: 9.3 FL (ref 6–12)
POTASSIUM SERPL-SCNC: 4.4 MMOL/L (ref 3.7–5.3)
PRO-BNP: 1487 PG/ML
PROTEIN UA: NEGATIVE
RBC # BLD: 4.79 M/UL (ref 4–5.2)
RBC # BLD: ABNORMAL 10*6/UL
SEG NEUTROPHILS: 75 % (ref 36–66)
SEGMENTED NEUTROPHILS ABSOLUTE COUNT: 4.7 K/UL (ref 1.8–7.7)
SODIUM BLD-SCNC: 133 MMOL/L (ref 135–144)
SPECIFIC GRAVITY UA: 1.01 (ref 1–1.03)
TROPONIN INTERP: ABNORMAL
TROPONIN T: ABNORMAL NG/ML
TROPONIN, HIGH SENSITIVITY: 32 NG/L (ref 0–14)
TROPONIN, HIGH SENSITIVITY: 35 NG/L (ref 0–14)
TROPONIN, HIGH SENSITIVITY: 41 NG/L (ref 0–14)
TURBIDITY: CLEAR
URINE HGB: NEGATIVE
UROBILINOGEN, URINE: NORMAL
WBC # BLD: 6.2 K/UL (ref 3.5–11)
WBC # BLD: ABNORMAL 10*3/UL

## 2021-01-24 PROCEDURE — 6360000004 HC RX CONTRAST MEDICATION: Performed by: EMERGENCY MEDICINE

## 2021-01-24 PROCEDURE — 93005 ELECTROCARDIOGRAM TRACING: CPT | Performed by: EMERGENCY MEDICINE

## 2021-01-24 PROCEDURE — 36415 COLL VENOUS BLD VENIPUNCTURE: CPT

## 2021-01-24 PROCEDURE — 80048 BASIC METABOLIC PNL TOTAL CA: CPT

## 2021-01-24 PROCEDURE — 84484 ASSAY OF TROPONIN QUANT: CPT

## 2021-01-24 PROCEDURE — 71260 CT THORAX DX C+: CPT

## 2021-01-24 PROCEDURE — 83880 ASSAY OF NATRIURETIC PEPTIDE: CPT

## 2021-01-24 PROCEDURE — 99223 1ST HOSP IP/OBS HIGH 75: CPT | Performed by: NURSE PRACTITIONER

## 2021-01-24 PROCEDURE — 2580000003 HC RX 258: Performed by: EMERGENCY MEDICINE

## 2021-01-24 PROCEDURE — 85025 COMPLETE CBC W/AUTO DIFF WBC: CPT

## 2021-01-24 PROCEDURE — 85379 FIBRIN DEGRADATION QUANT: CPT

## 2021-01-24 PROCEDURE — 6370000000 HC RX 637 (ALT 250 FOR IP): Performed by: CLINICAL NURSE SPECIALIST

## 2021-01-24 PROCEDURE — 82947 ASSAY GLUCOSE BLOOD QUANT: CPT

## 2021-01-24 PROCEDURE — 81003 URINALYSIS AUTO W/O SCOPE: CPT

## 2021-01-24 PROCEDURE — 1200000000 HC SEMI PRIVATE

## 2021-01-24 PROCEDURE — 2580000003 HC RX 258: Performed by: CLINICAL NURSE SPECIALIST

## 2021-01-24 PROCEDURE — 71045 X-RAY EXAM CHEST 1 VIEW: CPT

## 2021-01-24 PROCEDURE — 6360000002 HC RX W HCPCS: Performed by: CLINICAL NURSE SPECIALIST

## 2021-01-24 PROCEDURE — 99285 EMERGENCY DEPT VISIT HI MDM: CPT

## 2021-01-24 RX ORDER — SODIUM CHLORIDE 0.9 % (FLUSH) 0.9 %
10 SYRINGE (ML) INJECTION EVERY 12 HOURS SCHEDULED
Status: DISCONTINUED | OUTPATIENT
Start: 2021-01-24 | End: 2021-01-25 | Stop reason: HOSPADM

## 2021-01-24 RX ORDER — NITROGLYCERIN 0.4 MG/1
0.4 TABLET SUBLINGUAL EVERY 5 MIN PRN
Status: DISCONTINUED | OUTPATIENT
Start: 2021-01-24 | End: 2021-01-25 | Stop reason: HOSPADM

## 2021-01-24 RX ORDER — SODIUM CHLORIDE 0.9 % (FLUSH) 0.9 %
10 SYRINGE (ML) INJECTION PRN
Status: DISCONTINUED | OUTPATIENT
Start: 2021-01-24 | End: 2021-01-25 | Stop reason: HOSPADM

## 2021-01-24 RX ORDER — SODIUM CHLORIDE 0.9 % (FLUSH) 0.9 %
10 SYRINGE (ML) INJECTION PRN
Status: DISCONTINUED | OUTPATIENT
Start: 2021-01-24 | End: 2021-01-24

## 2021-01-24 RX ORDER — ACETAMINOPHEN 650 MG/1
650 SUPPOSITORY RECTAL EVERY 6 HOURS PRN
Status: DISCONTINUED | OUTPATIENT
Start: 2021-01-24 | End: 2021-01-25 | Stop reason: HOSPADM

## 2021-01-24 RX ORDER — POTASSIUM CHLORIDE 7.45 MG/ML
10 INJECTION INTRAVENOUS PRN
Status: DISCONTINUED | OUTPATIENT
Start: 2021-01-24 | End: 2021-01-25 | Stop reason: HOSPADM

## 2021-01-24 RX ORDER — PROMETHAZINE HYDROCHLORIDE 12.5 MG/1
12.5 TABLET ORAL EVERY 6 HOURS PRN
Status: DISCONTINUED | OUTPATIENT
Start: 2021-01-24 | End: 2021-01-25 | Stop reason: HOSPADM

## 2021-01-24 RX ORDER — FUROSEMIDE 10 MG/ML
20 INJECTION INTRAMUSCULAR; INTRAVENOUS ONCE
Status: COMPLETED | OUTPATIENT
Start: 2021-01-24 | End: 2021-01-25

## 2021-01-24 RX ORDER — MAGNESIUM SULFATE 1 G/100ML
1000 INJECTION INTRAVENOUS PRN
Status: DISCONTINUED | OUTPATIENT
Start: 2021-01-24 | End: 2021-01-25 | Stop reason: HOSPADM

## 2021-01-24 RX ORDER — ONDANSETRON 2 MG/ML
4 INJECTION INTRAMUSCULAR; INTRAVENOUS EVERY 6 HOURS PRN
Status: DISCONTINUED | OUTPATIENT
Start: 2021-01-24 | End: 2021-01-25 | Stop reason: HOSPADM

## 2021-01-24 RX ORDER — DOCUSATE SODIUM 100 MG/1
100 CAPSULE, LIQUID FILLED ORAL 2 TIMES DAILY PRN
Status: DISCONTINUED | OUTPATIENT
Start: 2021-01-24 | End: 2021-01-25 | Stop reason: HOSPADM

## 2021-01-24 RX ORDER — ASPIRIN 81 MG/1
81 TABLET, CHEWABLE ORAL 2 TIMES DAILY
Status: DISCONTINUED | OUTPATIENT
Start: 2021-01-24 | End: 2021-01-25 | Stop reason: HOSPADM

## 2021-01-24 RX ORDER — GABAPENTIN 100 MG/1
200 CAPSULE ORAL NIGHTLY
Status: DISCONTINUED | OUTPATIENT
Start: 2021-01-24 | End: 2021-01-25 | Stop reason: HOSPADM

## 2021-01-24 RX ORDER — ATORVASTATIN CALCIUM 40 MG/1
40 TABLET, FILM COATED ORAL NIGHTLY
Status: DISCONTINUED | OUTPATIENT
Start: 2021-01-24 | End: 2021-01-25 | Stop reason: HOSPADM

## 2021-01-24 RX ORDER — LORAZEPAM 0.5 MG/1
0.5 TABLET ORAL 2 TIMES DAILY
Status: DISCONTINUED | OUTPATIENT
Start: 2021-01-24 | End: 2021-01-25 | Stop reason: HOSPADM

## 2021-01-24 RX ORDER — VENLAFAXINE 75 MG/1
150 TABLET ORAL DAILY
Status: DISCONTINUED | OUTPATIENT
Start: 2021-01-25 | End: 2021-01-25 | Stop reason: HOSPADM

## 2021-01-24 RX ORDER — ACETAMINOPHEN 325 MG/1
650 TABLET ORAL EVERY 6 HOURS PRN
Status: DISCONTINUED | OUTPATIENT
Start: 2021-01-24 | End: 2021-01-25 | Stop reason: HOSPADM

## 2021-01-24 RX ORDER — POTASSIUM CHLORIDE 20 MEQ/1
40 TABLET, EXTENDED RELEASE ORAL PRN
Status: DISCONTINUED | OUTPATIENT
Start: 2021-01-24 | End: 2021-01-25 | Stop reason: HOSPADM

## 2021-01-24 RX ORDER — 0.9 % SODIUM CHLORIDE 0.9 %
80 INTRAVENOUS SOLUTION INTRAVENOUS ONCE
Status: COMPLETED | OUTPATIENT
Start: 2021-01-24 | End: 2021-01-24

## 2021-01-24 RX ORDER — CETIRIZINE HYDROCHLORIDE 10 MG/1
10 TABLET ORAL DAILY
Status: DISCONTINUED | OUTPATIENT
Start: 2021-01-25 | End: 2021-01-25 | Stop reason: HOSPADM

## 2021-01-24 RX ADMIN — GABAPENTIN 200 MG: 100 CAPSULE ORAL at 21:59

## 2021-01-24 RX ADMIN — SODIUM CHLORIDE 80 ML: 9 INJECTION, SOLUTION INTRAVENOUS at 16:32

## 2021-01-24 RX ADMIN — TRAZODONE HYDROCHLORIDE 150 MG: 100 TABLET ORAL at 21:59

## 2021-01-24 RX ADMIN — ACETAMINOPHEN 650 MG: 325 TABLET ORAL at 23:45

## 2021-01-24 RX ADMIN — Medication 10 ML: at 16:32

## 2021-01-24 RX ADMIN — Medication 10 ML: at 21:59

## 2021-01-24 RX ADMIN — DESMOPRESSIN ACETATE 40 MG: 0.2 TABLET ORAL at 21:59

## 2021-01-24 RX ADMIN — ASPIRIN 81 MG: 81 TABLET, CHEWABLE ORAL at 22:23

## 2021-01-24 RX ADMIN — IOPAMIDOL 75 ML: 755 INJECTION, SOLUTION INTRAVENOUS at 16:32

## 2021-01-24 RX ADMIN — ENOXAPARIN SODIUM 30 MG: 30 INJECTION SUBCUTANEOUS at 22:00

## 2021-01-24 ASSESSMENT — ENCOUNTER SYMPTOMS
CHEST TIGHTNESS: 0
DIARRHEA: 0
WHEEZING: 0
PHOTOPHOBIA: 0
ABDOMINAL PAIN: 0
ABDOMINAL DISTENTION: 0
SORE THROAT: 0
CONSTIPATION: 0
SHORTNESS OF BREATH: 0
VOMITING: 0
NAUSEA: 0
BACK PAIN: 1
SINUS PRESSURE: 0
COLOR CHANGE: 0
TROUBLE SWALLOWING: 0
COUGH: 0

## 2021-01-24 ASSESSMENT — PAIN SCALES - GENERAL: PAINLEVEL_OUTOF10: 0

## 2021-01-24 ASSESSMENT — PAIN DESCRIPTION - PAIN TYPE: TYPE: ACUTE PAIN;CHRONIC PAIN

## 2021-01-24 NOTE — ED NOTES
Pt assisted to chair. Pt instructed to use call light for assistance when getting up. Pt verbalized understanding.       Rafy Carr RN  01/24/21 0630

## 2021-01-24 NOTE — ED NOTES
Writer contacted [facility] at this time regarding transfer to [facility] per request from Dr Chelsea Sorensen. Pauline ralph put out a page to [facility] hospitalist at this time.       Ashley Basurto RN  01/24/21 9512

## 2021-01-24 NOTE — ED NOTES
[facility], DO at bedside updating pt on results and plan of care.          Latricia Xiong, RN  01/24/21 6204

## 2021-01-24 NOTE — ED NOTES
Writer at bedside. Pt states that she was \"so winded getting into the shower, that she was scared. \"  Dr Natalie Peres notified. Pt agreeable to admission.       Mariel Preston RN  01/24/21 5316

## 2021-01-24 NOTE — ED NOTES
Pt assisted to bathroom with walker. Pt ambulates back to room. Pt denies needs at this time, will continue to monitor.       Betina Ingram RN  01/24/21 8251

## 2021-01-24 NOTE — ED NOTES
Pt assisted to the bedside commode. Pt assisted out of bed. Pt unsteady on her feet, writer remains with patient. Pt states that she feels weak in her legs when she stands.       Rafy Carr RN  01/24/21 [phone] LAYTON Servin  01/24/21 3037

## 2021-01-24 NOTE — ED NOTES
Marcin Batista contacted for assistance with transportation home.       Aleksander Acevedo RN  01/24/21 7313

## 2021-01-24 NOTE — ED NOTES
Bed: ER04  Expected date: 1/24/21  Expected time:   Means of arrival:   Comments:  Deric Botello RN  01/24/21 0358

## 2021-01-24 NOTE — ED NOTES
Pt short of breath with exertion. She states that she feels winded walking around her apartment. Pt assisted to [facility] and into bed. Dr Nolan Goes updated.       Natasha Gomez RN  01/24/21 9161

## 2021-01-24 NOTE — ED NOTES
Pt informs writer that she stopped taking Ativan 7-10 days ago.       Michael Bingham RN  01/24/21 2050

## 2021-01-24 NOTE — ED NOTES
Pt ambulates to bathroom with a walker. Pt states that she \"feels good walking. \"  Auditory wheezes noted. Dr Hodges Nails updated.       Rick De La Torre RN  01/24/21 0036

## 2021-01-24 NOTE — ED PROVIDER NOTES
[id number] [facility] ED  [address] [facility] [address]  Phone: [phone]  Fax: Marly Whiteside [phone]      Pt Name: Giuseppe Vickers  MRN: [id number]  [place] 1940  Date of evaluation: 1/24/2021    CHIEF COMPLAINT       Chief Complaint   Patient presents with   [address]    Giuseppe Vickers is a [de-identified] y.o. female who presents to the emergency department by ambulance for anxiety. Last night got worked up over situation with her daughters. Woke up this morning he was very shaky and had generalized weakness with leg heaviness. She denies chest pain or shortness of breath. Called the ambulance who found her to be very anxious. On arrival she says she is feeling a little bit better. No fevers or chills cough or congestion. No other recent illnesses. She denies any other complaints. REVIEW OF SYSTEMS       Constitutional: No fevers or chills positive shakiness generalized fatigue weakness  HEENT: No sore throat, rhinorrhea, or earache   Eyes: No blurry vision or double vision no drainage   Cardiovascular: No chest pain or tachycardia   Respiratory: No wheezing or shortness of breath no cough   Gastrointestinal: No nausea, vomiting, diarrhea, constipation, or abdominal pain   : No hematuria or dysuria   Musculoskeletal: No swelling or pain   Skin: No rash   Neurological: No focal neurologic complaints, paresthesias, weakness, or headache     PAST MEDICAL HISTORY    has a past medical history of Anxiety, Asthma, Depression, Hyperlipidemia, and Neuropathy. SURGICAL HISTORY      has a past surgical history that includes Appendectomy; Tonsillectomy; Nasal polyp surgery; Cataract removal with implant; Ankle fracture surgery (Right, 03/02/2018); Ankle surgery (Right, 03/02/2018); and open tx trimalleolar ankle fx w fix pst lip (Right, 3/2/2018).     CURRENT MEDICATIONS       Previous Medications    ASPIRIN 81 MG CHEWABLE TABLET    Take 1 tablet by mouth 2 times daily    DOCUSATE SODIUM (COLACE) 100 MG CAPSULE    Take 1 capsule by mouth 2 times daily as needed for Constipation    GABAPENTIN (NEURONTIN) 100 MG CAPSULE    Take 200 mg by mouth nightly    LORATADINE (CLARITIN) 10 MG CAPSULE    Take 10 mg by mouth daily    LORAZEPAM (ATIVAN) 0.5 MG TABLET    Take 0.5 mg by mouth 2 times daily. MISC. DEVICES MISC    Lateral  bracefor right knee    ROSUVASTATIN CALCIUM (CRESTOR PO)    Take  by mouth. SCOOTER MISC    by Does not apply route Knee scooter    TRAZODONE (DESYREL) 150 MG TABLET    Take 150 mg by mouth nightly    VENLAFAXINE (EFFEXOR) 37.5 MG TABLET    Take 150 mg by mouth daily        ALLERGIES     has No Known Allergies. FAMILY HISTORY     has no family status information on file. family history is not on file. SOCIAL HISTORY      reports that she quit smoking about 38 years ago. She has never used smokeless tobacco. She reports that she does not drink alcohol or use drugs. PHYSICAL EXAM       ED Triage Vitals   BP Temp Temp Source Pulse Resp SpO2 Height Weight   01/24/21 1206 01/24/21 1208 01/24/21 1208 01/24/21 1206 01/24/21 1206 01/24/21 1206 -- 01/24/21 1208   (!) 184/90 98.3 °F (36.8 °C) Oral 92 16 97 %  200 lb (90.7 kg)       Constitutional: Alert, oriented x3, nontoxic, answering questions appropriately, acting properly for age, in no acute distress   HEENT: Extraocular muscles intact, mucous membranes moist  Neck: Trachea midline   Cardiovascular: Regular rhythm and rate no S3, S4, or murmurs   Respiratory: Clear to auscultation bilaterally no wheezes, rhonchi, rales, no respiratory distress no tachypnea no retractions no hypoxia  Gastrointestinal: Soft, nontender, nondistended, positive bowel sounds. No rebound, rigidity, or guarding. Musculoskeletal: Bilateral lower extremity pain with movement no calf tenderness no asymmetry. Neurologic: No gross focal neurologic deficits.   Skin: Warm and dry DIFFERENTIAL DIAGNOSIS/ MDM:     IV and labs. Suspect anxiety. Rule out cardiac etiology. DIAGNOSTIC RESULTS     EKG: All EKG's are interpreted by the Emergency Department Physician who either signs or Co-signs this chart in the absence of a cardiologist.    1227 sinus rhythm rate 90  QRS 84  no acute ST or T wave changes.     Not indicated unless otherwise documented above    LABS:  Results for orders placed or performed during the hospital encounter of 01/24/21   CBC Auto Differential   Result Value Ref Range    WBC 6.2 3.5 - 11.0 k/uL    RBC 4.79 4.0 - 5.2 m/uL    Hemoglobin 13.0 12.0 - 16.0 g/dL    Hematocrit 39.8 36 - 46 %    MCV 83.1 80 - 100 fL    MCH 27.2 26 - 34 pg    MCHC 32.8 31 - 37 g/dL    RDW 16.1 (H) 12.5 - 15.4 %    Platelets 618 691 - 053 k/uL    MPV 9.3 6.0 - 12.0 fL    NRBC Automated NOT REPORTED per 100 WBC    Differential Type NOT REPORTED     Seg Neutrophils 75 (H) 36 - 66 %    Lymphocytes 11 (L) 24 - 44 %    Monocytes 10 2 - 11 %    Eosinophils % 3 1 - 4 %    Basophils 1 0 - 2 %    Immature Granulocytes NOT REPORTED 0 %    Segs Absolute 4.70 1.8 - 7.7 k/uL    Absolute Lymph # 0.70 (L) 1.0 - 4.8 k/uL    Absolute Mono # 0.60 0.1 - 1.2 k/uL    Absolute Eos # 0.20 0.0 - 0.4 k/uL    Basophils Absolute 0.10 0.0 - 0.2 k/uL    Absolute Immature Granulocyte NOT REPORTED 0.00 - 0.30 k/uL    WBC Morphology NOT REPORTED     RBC Morphology NOT REPORTED     Platelet Estimate NOT REPORTED    Basic Metabolic Panel   Result Value Ref Range    Glucose 105 (H) 70 - 99 mg/dL    BUN 14 8 - 23 mg/dL    CREATININE 0.92 (H) 0.50 - 0.90 mg/dL    Bun/Cre Ratio NOT REPORTED 9 - 20    Calcium 9.7 8.6 - 10.4 mg/dL    Sodium 133 (L) 135 - 144 mmol/L    Potassium 4.4 3.7 - 5.3 mmol/L    Chloride 101 98 - 107 mmol/L    CO2 22 20 - 31 mmol/L    Anion Gap 10 9 - 17 mmol/L    GFR Non-African American 59 (L) >60 mL/min    GFR African American >60 >60 mL/min    GFR Comment          GFR Staging NOT REPORTED Urinalysis Reflex to Culture    Specimen: Urine, clean catch   Result Value Ref Range    Color, UA YELLOW YELLOW    Turbidity UA CLEAR CLEAR    Glucose, Ur NEGATIVE NEGATIVE    Bilirubin Urine NEGATIVE NEGATIVE    Ketones, Urine TRACE (A) NEGATIVE    Specific Gravity, UA 1.010 1.005 - 1.030    Urine Hgb NEGATIVE NEGATIVE    pH, UA 5.5 5.0 - 8.0    Protein, UA NEGATIVE NEGATIVE    Urobilinogen, Urine Normal Normal    Nitrite, Urine NEGATIVE NEGATIVE    Leukocyte Esterase, Urine NEGATIVE NEGATIVE    Urinalysis Comments       Microscopic exam not performed based on chemical results unless requested in original order. Urinalysis Comments          Urinalysis Comments       Utilizing a urinalysis as the only screening method to exclude a potential uropathogen can be unreliable in many patient populations. Rapid screening tests are less sensitive than culture and if UTI is a clinical possibility, culture should be considered despite a negative urinalysis.    Troponin   Result Value Ref Range    Troponin, High Sensitivity 32 (H) 0 - 14 ng/L    Troponin T NOT REPORTED <0.03 ng/mL    Troponin Interp NOT REPORTED    Troponin   Result Value Ref Range    Troponin, High Sensitivity 35 (H) 0 - 14 ng/L    Troponin T NOT REPORTED <0.03 ng/mL    Troponin Interp NOT REPORTED    Brain Natriuretic Peptide   Result Value Ref Range    Pro-BNP 1,487 (H) <300 pg/mL    BNP Interpretation Pro-BNP Reference Range:    D-Dimer, Quantitative   Result Value Ref Range    D-Dimer, Quant 0.71 mg/L FEU   POC Glucose Fingerstick   Result Value Ref Range    POC Glucose 146 (H) 65 - 105 mg/dL   EKG 12 Lead   Result Value Ref Range    Ventricular Rate 90 BPM    Atrial Rate 90 BPM    P-R Interval 284 ms    QRS Duration 84 ms    Q-T Interval 368 ms    QTc Calculation (Bazett) 450 ms    P Axis 68 degrees    R Axis 11 degrees    T Axis 62 degrees       Not indicated unless otherwise documented above    RADIOLOGY:   I reviewed the radiologist interpretations:    CT CHEST PULMONARY EMBOLISM W CONTRAST   Final Result   1. No pulmonary embolism. 2. No acute pulmonary disease. 3. Probably benign adrenal adenomas right adrenal gland, incompletely   characterized on this exam.  Correlation with noncontrast CT abdomen   recommended no sooner than 48 hours following IV contrast administration. 4. Benign left adrenal adenoma. 5. Mild cardiomegaly. 6. Goitrous appearance right thyroid lobe. XR CHEST PORTABLE   Final Result   1. Suspected small left-sided pleural effusion and left basilar atelectasis. Follow-up recommended to document resolution. 2. Mild cardiomegaly. Mild pulmonary vascular congestion. Not indicated unless otherwise documented above    EMERGENCY DEPARTMENT COURSE:     The patient was given the following medications:  Orders Placed This Encounter   Medications    sodium chloride flush 0.9 % injection 10 mL    iopamidol (ISOVUE-370) 76 % injection 75 mL    0.9 % sodium chloride bolus        Vitals:   -------------------------  BP (!) 156/93   Pulse 89   Temp 98.3 °F (36.8 °C) (Oral)   Resp 19   Wt 90.7 kg (200 lb)   SpO2 96%   BMI 32.76 kg/m²     1:45 PM feeling a little bit better. Labs are unremarkable with exception of a slightly elevated troponin will repeat at the 2-hour dilia. She denies any chest pain or shortness of breath. 3 PM slight elevation in second troponin but only by 3 points. She is denying any chest pain. She does have some shortness of breath when she gets anxious. She was questioning how she would be able to function at home and states that her legs are not working but was able to get up to the bathroom with a walker 3-4 times and walked about 20 to 30 feet each time with minimal difficulty. She had something to eat and now feels more shaky. She was offered admission to the hospital but does not want to stay. Will recheck blood sugar and try to get her home.     4 PM elevated D-dimer prompted a CT of the chest to rule out pulmonary embolism as a cause for her tachycardia and shakiness. 5 PM patient becoming more agitated and frustrated after looking at a magazine with Vipin Bazan and it and her heart rate went to the 150s and 160s and then slowly returned to normal.  Patient was calmed through further conversation. Agreeable to admission ultimately. CT of the chest unremarkable for acute process. 5:30 PM discussed with Karin Crowell who agrees to admission. Awaiting bed assignment and transport. I have reviewed the disposition diagnosis with the patient and or their family/guardian. I have answered their questions and given discharge instructions. They voiced understanding of these instructions and did not have any furtherquestions or complaints. CRITICAL CARE:    None    CONSULTS:    None    PROCEDURES:    None      OARRS Report if indicated             FINAL IMPRESSION      1. Shakiness    2. Anxiety state    3. Dyspnea and respiratory abnormalities    4. Paroxysmal supraventricular tachycardia ([address]          DISPOSITION/PLAN   DISPOSITION Decision To Admit 01/24/2021 03:58:47 PM        CONDITION ON DISPOSITION: STABLE       PATIENT REFERRED TO:  No follow-up provider specified.     DISCHARGE MEDICATIONS:  New Prescriptions    No medications on file       (Please note that portions of thisnote were completed with a voice recognition program.  Efforts were made to edit the dictations but occasionally words are mis-transcribed.)    Conchita Otto, DO  Attending Emergency Physician     Conchita Otto, DO  01/24/21 6991

## 2021-01-24 NOTE — ED NOTES
Pt arrives to ED via [facility]. She states that she got in a heated conversation with a family member last night, which made her upset. She states that she can't stop shaking and feels anxious. She states that she was on Ativan, but has stopped taking it. She also states that she has felt weak at home. She states that she she stands up in her chair, she will fall back into her chair at times. Pt offered, comfort measures. Denies needs, warm blanket applied.       Cydney Kelley RN  01/24/21 3789

## 2021-01-24 NOTE — ED NOTES
Pt assisted to bathroom with walker. Pt instructed to pull red cord if assistance is needed. Pt verbalized understanding.       Aleksander Acevedo RN  01/24/21 6344

## 2021-01-24 NOTE — ED NOTES
Darcy Mahan, DO at bedside updating pt on results and plan of care.          Cydney Kelley RN  01/24/21 0602

## 2021-01-25 ENCOUNTER — APPOINTMENT (OUTPATIENT)
Dept: NUCLEAR MEDICINE | Age: 81
DRG: 309 | End: 2021-01-25
Payer: MEDICARE

## 2021-01-25 VITALS
RESPIRATION RATE: 20 BRPM | DIASTOLIC BLOOD PRESSURE: 58 MMHG | HEART RATE: 79 BPM | WEIGHT: 200 LBS | OXYGEN SATURATION: 97 % | TEMPERATURE: 98.6 F | BODY MASS INDEX: 32.76 KG/M2 | SYSTOLIC BLOOD PRESSURE: 141 MMHG

## 2021-01-25 LAB
ALBUMIN SERPL-MCNC: 3.9 G/DL (ref 3.5–5.2)
ALBUMIN/GLOBULIN RATIO: 1.6 (ref 1–2.5)
ALP BLD-CCNC: 86 U/L (ref 35–104)
ALT SERPL-CCNC: 11 U/L (ref 5–33)
ANION GAP SERPL CALCULATED.3IONS-SCNC: 15 MMOL/L (ref 9–17)
AST SERPL-CCNC: 23 U/L
BILIRUB SERPL-MCNC: 0.56 MG/DL (ref 0.3–1.2)
BUN BLDV-MCNC: 14 MG/DL (ref 8–23)
BUN/CREAT BLD: ABNORMAL (ref 9–20)
CALCIUM SERPL-MCNC: 8.8 MG/DL (ref 8.6–10.4)
CHLORIDE BLD-SCNC: 109 MMOL/L (ref 98–107)
CHOLESTEROL/HDL RATIO: 2
CHOLESTEROL: 157 MG/DL
CO2: 19 MMOL/L (ref 20–31)
CREAT SERPL-MCNC: 1.02 MG/DL (ref 0.5–0.9)
EKG ATRIAL RATE: 90 BPM
EKG P AXIS: 68 DEGREES
EKG P-R INTERVAL: 284 MS
EKG Q-T INTERVAL: 368 MS
EKG QRS DURATION: 84 MS
EKG QTC CALCULATION (BAZETT): 450 MS
EKG R AXIS: 11 DEGREES
EKG T AXIS: 62 DEGREES
EKG VENTRICULAR RATE: 90 BPM
ESTIMATED AVERAGE GLUCOSE: 114 MG/DL
GFR AFRICAN AMERICAN: >60 ML/MIN
GFR NON-AFRICAN AMERICAN: 52 ML/MIN
GFR SERPL CREATININE-BSD FRML MDRD: ABNORMAL ML/MIN/{1.73_M2}
GFR SERPL CREATININE-BSD FRML MDRD: ABNORMAL ML/MIN/{1.73_M2}
GLUCOSE BLD-MCNC: 100 MG/DL (ref 70–99)
HBA1C MFR BLD: 5.6 % (ref 4–6)
HCT VFR BLD CALC: 38.9 % (ref 36.3–47.1)
HDLC SERPL-MCNC: 79 MG/DL
HEMOGLOBIN: 12.2 G/DL (ref 11.9–15.1)
LDL CHOLESTEROL: 60 MG/DL (ref 0–130)
LV EF: 61 %
LV EF: 70 %
LVEF MODALITY: NORMAL
LVEF MODALITY: NORMAL
MAGNESIUM: 2.2 MG/DL (ref 1.6–2.6)
MCH RBC QN AUTO: 27 PG (ref 25.2–33.5)
MCHC RBC AUTO-ENTMCNC: 31.4 G/DL (ref 28.4–34.8)
MCV RBC AUTO: 86.1 FL (ref 82.6–102.9)
NRBC AUTOMATED: 0 PER 100 WBC
PDW BLD-RTO: 16.3 % (ref 11.8–14.4)
PLATELET # BLD: 296 K/UL (ref 138–453)
PMV BLD AUTO: 11.6 FL (ref 8.1–13.5)
POTASSIUM SERPL-SCNC: 3.7 MMOL/L (ref 3.7–5.3)
RBC # BLD: 4.52 M/UL (ref 3.95–5.11)
SODIUM BLD-SCNC: 143 MMOL/L (ref 135–144)
TOTAL PROTEIN: 6.3 G/DL (ref 6.4–8.3)
TRIGL SERPL-MCNC: 92 MG/DL
TROPONIN INTERP: ABNORMAL
TROPONIN INTERP: ABNORMAL
TROPONIN T: ABNORMAL NG/ML
TROPONIN T: ABNORMAL NG/ML
TROPONIN, HIGH SENSITIVITY: 41 NG/L (ref 0–14)
TROPONIN, HIGH SENSITIVITY: 42 NG/L (ref 0–14)
VLDLC SERPL CALC-MCNC: NORMAL MG/DL (ref 1–30)
WBC # BLD: 8 K/UL (ref 3.5–11.3)

## 2021-01-25 PROCEDURE — 6370000000 HC RX 637 (ALT 250 FOR IP): Performed by: CLINICAL NURSE SPECIALIST

## 2021-01-25 PROCEDURE — 83036 HEMOGLOBIN GLYCOSYLATED A1C: CPT

## 2021-01-25 PROCEDURE — 3430000000 HC RX DIAGNOSTIC RADIOPHARMACEUTICAL: Performed by: FAMILY MEDICINE

## 2021-01-25 PROCEDURE — 78452 HT MUSCLE IMAGE SPECT MULT: CPT

## 2021-01-25 PROCEDURE — 93226 XTRNL ECG REC<48 HR SCAN A/R: CPT

## 2021-01-25 PROCEDURE — 80061 LIPID PANEL: CPT

## 2021-01-25 PROCEDURE — 36415 COLL VENOUS BLD VENIPUNCTURE: CPT

## 2021-01-25 PROCEDURE — A9500 TC99M SESTAMIBI: HCPCS | Performed by: FAMILY MEDICINE

## 2021-01-25 PROCEDURE — 93225 XTRNL ECG REC<48 HRS REC: CPT

## 2021-01-25 PROCEDURE — 6370000000 HC RX 637 (ALT 250 FOR IP): Performed by: INTERNAL MEDICINE

## 2021-01-25 PROCEDURE — 93017 CV STRESS TEST TRACING ONLY: CPT

## 2021-01-25 PROCEDURE — 6360000002 HC RX W HCPCS: Performed by: CLINICAL NURSE SPECIALIST

## 2021-01-25 PROCEDURE — 93005 ELECTROCARDIOGRAM TRACING: CPT | Performed by: CLINICAL NURSE SPECIALIST

## 2021-01-25 PROCEDURE — 99232 SBSQ HOSP IP/OBS MODERATE 35: CPT | Performed by: FAMILY MEDICINE

## 2021-01-25 PROCEDURE — 80053 COMPREHEN METABOLIC PANEL: CPT

## 2021-01-25 PROCEDURE — 6360000002 HC RX W HCPCS: Performed by: NURSE PRACTITIONER

## 2021-01-25 PROCEDURE — 2580000003 HC RX 258: Performed by: CLINICAL NURSE SPECIALIST

## 2021-01-25 PROCEDURE — 83735 ASSAY OF MAGNESIUM: CPT

## 2021-01-25 PROCEDURE — 93306 TTE W/DOPPLER COMPLETE: CPT

## 2021-01-25 PROCEDURE — 85027 COMPLETE CBC AUTOMATED: CPT

## 2021-01-25 PROCEDURE — 84484 ASSAY OF TROPONIN QUANT: CPT

## 2021-01-25 RX ORDER — ALBUTEROL SULFATE 90 UG/1
2 AEROSOL, METERED RESPIRATORY (INHALATION) PRN
Status: DISCONTINUED | OUTPATIENT
Start: 2021-01-25 | End: 2021-01-25

## 2021-01-25 RX ORDER — SODIUM CHLORIDE 9 MG/ML
500 INJECTION, SOLUTION INTRAVENOUS CONTINUOUS PRN
Status: DISCONTINUED | OUTPATIENT
Start: 2021-01-25 | End: 2021-01-25

## 2021-01-25 RX ORDER — ALPRAZOLAM 0.25 MG/1
0.25 TABLET ORAL 3 TIMES DAILY PRN
Qty: 9 TABLET | Refills: 0 | Status: SHIPPED | OUTPATIENT
Start: 2021-01-25 | End: 2021-01-30

## 2021-01-25 RX ORDER — AMINOPHYLLINE DIHYDRATE 25 MG/ML
50 INJECTION, SOLUTION INTRAVENOUS PRN
Status: ACTIVE | OUTPATIENT
Start: 2021-01-25 | End: 2021-01-25

## 2021-01-25 RX ORDER — ATROPINE SULFATE 0.1 MG/ML
0.5 INJECTION INTRAVENOUS EVERY 5 MIN PRN
Status: DISCONTINUED | OUTPATIENT
Start: 2021-01-25 | End: 2021-01-25

## 2021-01-25 RX ORDER — METOPROLOL TARTRATE 5 MG/5ML
5 INJECTION INTRAVENOUS EVERY 5 MIN PRN
Status: DISCONTINUED | OUTPATIENT
Start: 2021-01-25 | End: 2021-01-25

## 2021-01-25 RX ORDER — SODIUM CHLORIDE 0.9 % (FLUSH) 0.9 %
10 SYRINGE (ML) INJECTION PRN
Status: DISCONTINUED | OUTPATIENT
Start: 2021-01-25 | End: 2021-01-25

## 2021-01-25 RX ORDER — SODIUM CHLORIDE 0.9 % (FLUSH) 0.9 %
10 SYRINGE (ML) INJECTION PRN
Status: DISCONTINUED | OUTPATIENT
Start: 2021-01-25 | End: 2021-01-25 | Stop reason: HOSPADM

## 2021-01-25 RX ORDER — NITROGLYCERIN 0.4 MG/1
0.4 TABLET SUBLINGUAL EVERY 5 MIN PRN
Status: DISCONTINUED | OUTPATIENT
Start: 2021-01-25 | End: 2021-01-25

## 2021-01-25 RX ADMIN — ENOXAPARIN SODIUM 30 MG: 30 INJECTION SUBCUTANEOUS at 13:38

## 2021-01-25 RX ADMIN — TETRAKIS(2-METHOXYISOBUTYLISOCYANIDE)COPPER(I) TETRAFLUOROBORATE 16.5 MILLICURIE: 1 INJECTION, POWDER, LYOPHILIZED, FOR SOLUTION INTRAVENOUS at 07:30

## 2021-01-25 RX ADMIN — TETRAKIS(2-METHOXYISOBUTYLISOCYANIDE)COPPER(I) TETRAFLUOROBORATE 35 MILLICURIE: 1 INJECTION, POWDER, LYOPHILIZED, FOR SOLUTION INTRAVENOUS at 11:25

## 2021-01-25 RX ADMIN — ASPIRIN 81 MG: 81 TABLET, CHEWABLE ORAL at 13:36

## 2021-01-25 RX ADMIN — LORAZEPAM 0.5 MG: 0.5 TABLET ORAL at 10:19

## 2021-01-25 RX ADMIN — ACETAMINOPHEN 650 MG: 325 TABLET ORAL at 15:29

## 2021-01-25 RX ADMIN — REGADENOSON 0.4 MG: 0.08 INJECTION, SOLUTION INTRAVENOUS at 11:18

## 2021-01-25 RX ADMIN — METOPROLOL TARTRATE 25 MG: 25 TABLET ORAL at 13:37

## 2021-01-25 RX ADMIN — VENLAFAXINE 150 MG: 75 TABLET ORAL at 13:38

## 2021-01-25 RX ADMIN — FUROSEMIDE 20 MG: 10 INJECTION, SOLUTION INTRAMUSCULAR; INTRAVENOUS at 00:14

## 2021-01-25 RX ADMIN — Medication 10 ML: at 11:18

## 2021-01-25 RX ADMIN — Medication 10 ML: at 11:12

## 2021-01-25 ASSESSMENT — ENCOUNTER SYMPTOMS
DIARRHEA: 0
SHORTNESS OF BREATH: 0
COUGH: 0
RHINORRHEA: 0
WHEEZING: 0
NAUSEA: 0
VOMITING: 0
CONSTIPATION: 0
CHEST TIGHTNESS: 0
ABDOMINAL PAIN: 0
BLOOD IN STOOL: 0

## 2021-01-25 ASSESSMENT — PAIN SCALES - GENERAL: PAINLEVEL_OUTOF10: 5

## 2021-01-25 NOTE — H&P
[facility]  Office: [address], DO, Nelly Alvarez, DO, Venita Barrios, DO, Ash Griffin, DO, Harish Rivera MD, David Burrell MD, Amalia Davis MD, William Dejesus MD, Ivelisse Maynard MD, Cleo Braswell MD, Larry Casillas MD, Celeste Guerrero MD, Mbonu Mable Nyhan, MD, Joelle Kinney DO, Db Cabral MD, Blessing Anderson MD, Maik Limon DO, Danyelle David MD,  Ruben Wang DO, Tray Avelar MD, Amy Salomon MD, Alpesh Black, [facility], [address], CNP, Arcadio Cullen, CNP, Janae Escudero, CNS, Rachel Gifford, CNP, Baron Bowser, CNP, Edmond Fowler, CNP, Rad Aguirre, CNP, Naila Small, CNP, Meghan Garcia PA-C, Irma James, [facility], Sekou Lazaro, CNP, Randy Cruz, CNP, Renee Browne, CNP, Elver Talbert, CNP, Flip Martínez, [address]    HISTORY AND PHYSICAL EXAMINATION            Date:   1/24/2021  Patient name:  Vern Hemphill  Date of admission:  1/24/2021 12:05 PM  MRN:   [id number]  Account:  [de-identified]  YOB: 1940  PCP:    Camila Elizalde MD  Room:   0317/0317-01  Code Status:    Full Code    Chief Complaint:     Chief Complaint   Patient presents with    Anxiety       History Obtained From:     patient, electronic medical record    History of Present Illness:     Vern Hemphill is a [de-identified] y.o. Non-/non  female who presents with Anxiety   and is admitted to the hospital for the management of Paroxysmal supraventricular tachycardia ([facility] [address] This is an [de-identified] yr old female with history of severe anxiety, hypertension, and hld who presents to [facility] ER with complaints of 'feeling worked up' and 'having difficulty getting out of bed'. She denies any CP, SOB, ABD pain, n/v/d, cough, HA, fevers, dizziness, or numbness/tingling/weakness of extremities. Patient with extensive workup at outlying facility which was largely unremarkable.   Apparently, patient became suddenly tachycardic with HR: 130s while reading an article about [employer]. She responded well to supportive listening and reassurance and returned to baseline. Presenting labs reveal Na: 133, CRT: 0.92, GFR: 59, glucose: 146, Trop: 32-->35, D-Dimer: 0.71, CTA Chest: Neg PE, CXR: small left pleural effusion, mild cardiomegaly, and moderate vascular congestions. Patient is transferred to our facility for further workup and stress testing. On my evaluation, patient is very anxious and very emotional at times. She responds well to supportive listening, reassurance, and redirection. On further conversation, patient is actually 8 days into benzodiazepine withdrawal.  Reports she was taking Ativan TID as prescribed, and became overwhelmed with family stressors, and began taking too much. Thus she ran out of medications early. On trying to obtain refill of medication, her PCP was out of the office and pharmacy unable to refill. Patient endorses several episodes of heart racing and 'pounding' over the last several days since being off the ativan. Additionally, she mentions \"I have gained 40lbs in the last 2-3 months, and my legs feel very heavy\". She denies any known cardiac or CHF history. She denies any CP or SOB, ABD pain, n/v/d. She is tremulous at times. Uses a cane or walker to ambulate and is moving around without difficulty. She is denying continued use of Ativan at this time and states, \"I don't want to go back, I feel so much better off of it\".      Past Medical History:     Past Medical History:   Diagnosis Date    Anxiety     Asthma     Depression     Hyperlipidemia     Neuropathy 2014    bottom of feet        Past Surgical History:     Past Surgical History:   Procedure Laterality Date    ANKLE FRACTURE SURGERY Right 03/02/2018    ORIF    ANKLE SURGERY Right 03/02/2018    ORIF    APPENDECTOMY      CATARACT REMOVAL WITH IMPLANT      NASAL POLYP SURGERY      OPEN TX TRIMALLEOLAR ANKLE FX W FIX PST LIP Right 3/2/2018    ANKLE OPEN REDUCTION INTERNAL FIXATION  (SYNTHES, NSA=SPINAL VS. GENERAL, 3080 TABLE, C-ARM) performed by Franco Farias DO at [address] [facility]          Medications Prior to Admission:     Prior to Admission medications    Medication Sig Start Date End Date Taking? Authorizing Provider   traZODone (DESYREL) 150 MG tablet Take 150 mg by mouth nightly   Yes Historical Provider, MD   gabapentin (NEURONTIN) 100 MG capsule Take 200 mg by mouth nightly   Yes Historical Provider, MD   venlafaxine (EFFEXOR) 37.5 MG tablet Take 150 mg by mouth daily    Yes Historical Provider, MD   Misc. Devices MISC Lateral  bracefor right knee 8/16/18   Héctor Fan, DO   docusate sodium (COLACE) 100 MG capsule Take 1 capsule by mouth 2 times daily as needed for Constipation 3/3/18   Trav Caro, DO   aspirin 81 MG chewable tablet Take 1 tablet by mouth 2 times daily 3/3/18   Trav Caro, DO   loratadine (CLARITIN) 10 MG capsule Take 10 mg by mouth daily    Historical Provider, MD Narciso GIFFORD by Does not apply route Knee scooter 2/23/18   Franco Farias,    Rosuvastatin Calcium (CRESTOR PO) Take  by mouth. Historical Provider, MD   LORazepam (ATIVAN) 0.5 MG tablet Take 0.5 mg by mouth 2 times daily. Historical Provider, MD        Allergies:     Patient has no known allergies. Social History:     Tobacco:    reports that she quit smoking about 38 years ago. She has never used smokeless tobacco.  Alcohol:      reports no history of alcohol use. Drug Use:  reports no history of drug use. Family History:     History reviewed. No pertinent family history. Review of Systems:     Positive and Negative as described in HPI. Review of Systems   Constitutional: Positive for appetite change and unexpected weight change. Negative for activity change, fatigue and fever. HENT: Negative for sinus pressure, sore throat and trouble swallowing.     Eyes: Negative for photophobia and visual disturbance. Respiratory: Negative for cough, chest tightness, shortness of breath and wheezing. Cardiovascular: Positive for palpitations and leg swelling. Negative for chest pain. Gastrointestinal: Negative for abdominal distention, abdominal pain, constipation, diarrhea, nausea and vomiting. Genitourinary: Negative for decreased urine volume, difficulty urinating and hematuria. Musculoskeletal: Positive for back pain. Negative for myalgias, neck pain and neck stiffness. Skin: Negative for color change, rash and wound. Neurological: Positive for tremors and light-headedness. Negative for dizziness, syncope, facial asymmetry, speech difficulty, weakness and headaches. Psychiatric/Behavioral: Positive for decreased concentration and sleep disturbance. Negative for agitation, behavioral problems and confusion. The patient is nervous/anxious. Physical Exam:   BP (!) 151/85   Pulse 95   Temp 98.4 °F (36.9 °C) (Oral)   Resp 25   Wt 200 lb (90.7 kg)   SpO2 96%   BMI 32.76 kg/m²   Temp (24hrs), Av.4 °F (36.9 °C), Min:98.3 °F (36.8 °C), Max:98.4 °F (36.9 °C)    Recent Labs     21  1531   POCGLU 146*       Intake/Output Summary (Last 24 hours) at 2021  Last data filed at 2021 2111  Gross per 24 hour   Intake 360 ml   Output --   Net 360 ml       Physical Exam  Vitals signs and nursing note reviewed. Constitutional:       General: She is not in acute distress. Appearance: She is obese. She is ill-appearing. She is not toxic-appearing or diaphoretic. HENT:      Head: Normocephalic and atraumatic. Right Ear: External ear normal.      Left Ear: External ear normal.      Nose: Nose normal. No congestion or rhinorrhea. Mouth/Throat:      Mouth: Mucous membranes are dry. Pharynx: Oropharynx is clear. Eyes:      Extraocular Movements: Extraocular movements intact.       Conjunctiva/sclera: Conjunctivae normal. Pupils: Pupils are equal, round, and reactive to light. Neck:      Musculoskeletal: Normal range of motion and neck supple. No neck rigidity or muscular tenderness. Cardiovascular:      Rate and Rhythm: Normal rate and regular rhythm. Pulses: Normal pulses. Heart sounds: Normal heart sounds. No murmur. No friction rub. No gallop. Pulmonary:      Effort: Pulmonary effort is normal. No respiratory distress. Breath sounds: Normal breath sounds. No wheezing, rhonchi or rales. Abdominal:      General: Bowel sounds are normal. There is no distension. Palpations: Abdomen is soft. There is no mass. Tenderness: There is no abdominal tenderness. Musculoskeletal:         General: No tenderness or signs of injury. Right lower leg: Edema present. Left lower leg: Edema present. Skin:     General: Skin is warm and dry. Capillary Refill: Capillary refill takes less than 2 seconds. Coloration: Skin is not jaundiced. Findings: No bruising or erythema. Neurological:      General: No focal deficit present. Mental Status: She is alert and oriented to person, place, and time. Mental status is at baseline. Cranial Nerves: No cranial nerve deficit. Sensory: No sensory deficit. Motor: No weakness.    Psychiatric:         Judgment: Judgment normal.      Comments: Patient is very anxious, having difficulty with concentration, and with racing throught         Investigations:      Laboratory Testing:  Recent Results (from the past 24 hour(s))   EKG 12 Lead    Collection Time: 01/24/21 12:27 PM   Result Value Ref Range    Ventricular Rate 90 BPM    Atrial Rate 90 BPM    P-R Interval 284 ms    QRS Duration 84 ms    Q-T Interval 368 ms    QTc Calculation (Bazett) 450 ms    P Axis 68 degrees    R Axis 11 degrees    T Axis 62 degrees   CBC Auto Differential    Collection Time: 01/24/21 12:30 PM   Result Value Ref Range    WBC 6.2 3.5 - 11.0 k/uL    RBC 4.79 4.0 - 5.2 m/uL    Hemoglobin 13.0 12.0 - 16.0 g/dL    Hematocrit 39.8 36 - 46 %    MCV 83.1 80 - 100 fL    MCH 27.2 26 - 34 pg    MCHC 32.8 31 - 37 g/dL    RDW 16.1 (H) 12.5 - 15.4 %    Platelets 966 912 - 406 k/uL    MPV 9.3 6.0 - 12.0 fL    NRBC Automated NOT REPORTED per 100 WBC    Differential Type NOT REPORTED     Seg Neutrophils 75 (H) 36 - 66 %    Lymphocytes 11 (L) 24 - 44 %    Monocytes 10 2 - 11 %    Eosinophils % 3 1 - 4 %    Basophils 1 0 - 2 %    Immature Granulocytes NOT REPORTED 0 %    Segs Absolute 4.70 1.8 - 7.7 k/uL    Absolute Lymph # 0.70 (L) 1.0 - 4.8 k/uL    Absolute Mono # 0.60 0.1 - 1.2 k/uL    Absolute Eos # 0.20 0.0 - 0.4 k/uL    Basophils Absolute 0.10 0.0 - 0.2 k/uL    Absolute Immature Granulocyte NOT REPORTED 0.00 - 0.30 k/uL    WBC Morphology NOT REPORTED     RBC Morphology NOT REPORTED     Platelet Estimate NOT REPORTED    Basic Metabolic Panel    Collection Time: 01/24/21 12:30 PM   Result Value Ref Range    Glucose 105 (H) 70 - 99 mg/dL    BUN 14 8 - 23 mg/dL    CREATININE 0.92 (H) 0.50 - 0.90 mg/dL    Bun/Cre Ratio NOT REPORTED 9 - 20    Calcium 9.7 8.6 - 10.4 mg/dL    Sodium 133 (L) 135 - 144 mmol/L    Potassium 4.4 3.7 - 5.3 mmol/L    Chloride 101 98 - 107 mmol/L    CO2 22 20 - 31 mmol/L    Anion Gap 10 9 - 17 mmol/L    GFR Non-African American 59 (L) >60 mL/min    GFR African American >60 >60 mL/min    GFR Comment          GFR Staging NOT REPORTED    Troponin    Collection Time: 01/24/21 12:30 PM   Result Value Ref Range    Troponin, High Sensitivity 32 (H) 0 - 14 ng/L    Troponin T NOT REPORTED <0.03 ng/mL    Troponin Interp NOT REPORTED    Brain Natriuretic Peptide    Collection Time: 01/24/21 12:30 PM   Result Value Ref Range    Pro-BNP 1,487 (H) <300 pg/mL    BNP Interpretation Pro-BNP Reference Range:    D-Dimer, Quantitative    Collection Time: 01/24/21 12:30 PM   Result Value Ref Range    D-Dimer, Quant 0.71 mg/L FEU   Urinalysis Reflex to Culture    Collection Time: 01/24/21  1:15 PM    Specimen: Urine, clean catch   Result Value Ref Range    Color, UA YELLOW YELLOW    Turbidity UA CLEAR CLEAR    Glucose, Ur NEGATIVE NEGATIVE    Bilirubin Urine NEGATIVE NEGATIVE    Ketones, Urine TRACE (A) NEGATIVE    Specific Gravity, UA 1.010 1.005 - 1.030    Urine Hgb NEGATIVE NEGATIVE    pH, UA 5.5 5.0 - 8.0    Protein, UA NEGATIVE NEGATIVE    Urobilinogen, Urine Normal Normal    Nitrite, Urine NEGATIVE NEGATIVE    Leukocyte Esterase, Urine NEGATIVE NEGATIVE    Urinalysis Comments       Microscopic exam not performed based on chemical results unless requested in original order. Urinalysis Comments          Urinalysis Comments       Utilizing a urinalysis as the only screening method to exclude a potential uropathogen can be unreliable in many patient populations. Rapid screening tests are less sensitive than culture and if UTI is a clinical possibility, culture should be considered despite a negative urinalysis. Troponin    Collection Time: 01/24/21  2:33 PM   Result Value Ref Range    Troponin, High Sensitivity 35 (H) 0 - 14 ng/L    Troponin T NOT REPORTED <0.03 ng/mL    Troponin Interp NOT REPORTED    POC Glucose Fingerstick    Collection Time: 01/24/21  3:31 PM   Result Value Ref Range    POC Glucose 146 (H) 65 - 105 mg/dL       Imaging/Diagnostics:  Xr Chest Portable    Result Date: 1/24/2021  1. Suspected small left-sided pleural effusion and left basilar atelectasis. Follow-up recommended to document resolution. 2. Mild cardiomegaly. Mild pulmonary vascular congestion. Ct Chest Pulmonary Embolism W Contrast    Result Date: 1/24/2021  1. No pulmonary embolism. 2. No acute pulmonary disease. 3. Probably benign adrenal adenomas right adrenal gland, incompletely characterized on this exam.  Correlation with noncontrast CT abdomen recommended no sooner than 48 hours following IV contrast administration. 4. Benign left adrenal adenoma. 5. Mild cardiomegaly.  6. Goitrous appearance right thyroid lobe.       Assessment :      Hospital Problems           Last Modified POA    * (Principal) Paroxysmal supraventricular tachycardia ([facility] [address] 1/24/2021 Yes    Mixed hyperlipidemia 1/24/2021 Yes    Acute heart failure ([facility] [address] 1/24/2021 Yes    Elevated troponin 1/24/2021 Yes    Anxiety 1/24/2021 Yes          Plan:     Patient status inpatient in the Med/Surge    Acute Heart failure with unknown EF/ SVT?: effusion and congestion on CXR and pro-bnp: 1487, ECHO ordered to assess cardiac function and give 1 dose IV lasix, continue to trend troponin, suspect elevated secondary to the above, continuous telemetry and prn EKGs. Stress test ordered for AM, strict I&O and daily weights  Benzodiazepine withdrawal: continue supportive measures, patient is refusing further doses of PO ativan, encouraged supportive listening and redirection, she is hemodynamically stable without acute distress. Continuous telemetry. Consult social work for assistance with outpatient counseling/psychiatry  MILLIE/Depression: continue home doses of gabapentin, trazodone, and effexor, patient states they have seemed to be effective, discussed importance and benefits of counseling/ therapy, will consult social work to assist with facilitation  HLD: continue statin therapy, check lipids, and A1C in am  Daily labs, follow troponin, home medications continued, patient is refusing to continue ativan PO, and diet as ordered  DVT prophylaxis  Full Code    Consultations:   None    Patient is admitted as inpatient status because of co-morbidities listed above, severity of signs and symptoms as outlined, requirement for current medical therapies and most importantly because of direct risk to patient if care not provided in a hospital setting. Expected length of stay > 48 hours.     Kristin Gil, APRN - NP  1/24/2021  9:36 PM    Copy sent to Dr. Danielle Mayers MD

## 2021-01-25 NOTE — CARE COORDINATION
Case Management Initial Discharge Plan  Nataliia John,             Met with:patient to discuss discharge plans. Information verified: address, contacts, phone number, , insurance Yes    Emergency Contact/Next of Kin name & number: Merle Quarles [phone] - Patient does not want to list a family member as an emergency contact     PCP: Monica Oscar MD  Date of last visit: 1 month ago    Insurance Provider: [facility]     Discharge Planning    Living Arrangements:  Alone   Support Systems:  Friends/Neighbors    Home has 1 stories  0 stairs to climb to get into front door, na stairs to climb to reach second floor  Location of bedroom/bathroom in home first floor     Patient able to perform ADL's:Independent    Current Services (outpatient & in home) none   DME equipment: none   DME provider: na     Receiving oral anticoagulation therapy? No    If indicated:   Physician managing anticoagulation treatment: na  Where does patient obtain lab work for ATC treatment? na      Potential Assistance Needed:  N/A    Patient agreeable to home care: No  [place] of choice provided:  n/a    Prior SNF/Rehab Placement and Facility: none   Agreeable to SNF/Rehab: No  [place] of choice provided: n/a     Evaluation: no    Expected Discharge date:       Patient expects to be discharged to:  home  Follow Up Appointment: Best Day/ Time:      Transportation provider: unsure   Transportation arrangements needed for discharge: Yes    Readmission Risk              Risk of Unplanned Readmission:        12             Does patient have a readmission risk score greater than 14?: No  If yes, follow-up appointment must be made within 7 days of discharge. Goals of Care: To get better and go home       Discharge Plan: Home independently. May need a ride.           Electronically signed by Guillermo Sifuentes RN on 21 at 10:54 AM EST

## 2021-01-25 NOTE — PROGRESS NOTES
[facility]  Documentation Note                Admission Dx: Paroxysmal supraventricular tachycardia ([facility] [address] [I47.1]    Past Medical History:   has a past medical history of Anxiety, Asthma, Depression, Hyperlipidemia, and Neuropathy. Previous Testing:     ECHO 3/10/14: EF 55%, reduced LV diastolic compliance, mild MR. Previous office/hospital visit:   Dr. Vidal Iyer   Insomnia. Anxiety. Asthma. Chronic fatigue and dyspnea. Previously had osteopathic neck, upper back and lower back evaluations. Hyperlipidemia. Exogenous obesity. Peripheral neuropathy, will be seeing a neurologist.    Plan --   Exogenous obesity and weight loss. We discussed more weight loss options with her, including diet and exercise, and the [facility]. Shes been on Adipex and has lost about 11 pounds. She will continue with us closely. We refilled her Adipex today. Well see her back in one month.     Arlo Ganser, [facility]

## 2021-01-25 NOTE — CARE COORDINATION
Consult received this date for outpt counseling/psychiatry  Chart reviewed. Pt presents with anxiety. met with pt this date was awake and cooperative. Pt states she has 2 dtrs and 4 grandsons. 1 dtr Juanita live in [address] [place] [address], however has not spoken to her 20 years years. Pt states Rodrick Ackerman has 2 sons Evelia Brody and Fawn Hall. Pt also reports she has a heated argument with her dtr in [place] a few weeks ago which stressed her out. No contact with dtr since that time. Pt states she is currently taking ativan for her anxiety ,which is prescribed and managed by her PCP. Counseling options discussed and pt declined. Writer provided a list of [phone] if decides to seek treatment after d/c . Insurance is [employer].

## 2021-01-25 NOTE — CONSULTS
[facility]  In PatientCardiology Consult             Date:   1/25/2021  Patient name: Carmen Yu  Date of admission:  1/24/2021 12:05 PM  MRN:   [id number]  YOB: 1940    Reason for Admission: SVT     CHIEF COMPLAINT: Anxiety, Weakness      History Obtained From:  Patient and medical records. HISTORY OF PRESENT ILLNESS:      Carmen Yu is a [de-identified] y.o. Non-/non  female who presents with Anxiety   and is admitted to the hospital for the management of Paroxysmal supraventricular tachycardia ([address]    This is an [de-identified] yr old female with history of severe anxiety, hypertension, and hld who presents to hospitals ER with complaints of 'feeling worked up' and 'having difficulty getting out of bed'. She denies any CP, SOB, ABD pain, n/v/d, cough, HA, fevers, dizziness, or numbness/tingling/weakness of extremities. Patient with extensive workup at outlying facility which was largely unremarkable. Apparently, patient became suddenly tachycardic with HR: 130s while reading an article about [employer]. She responded well to supportive listening and reassurance and returned to baseline. Presenting labs reveal Na: 133, CRT: 0.92, GFR: 59, glucose: 146, Trop: 32-->35, D-Dimer: 0.71, CTA Chest: Neg PE, CXR: small left pleural effusion, mild cardiomegaly, and moderate vascular congestions. Patient is transferred to our facility for further workup and stress testing. Past Medical History:   has a past medical history of Anxiety, Asthma, Depression, Hyperlipidemia, and Neuropathy. Past Surgical History:   has a past surgical history that includes Appendectomy; Tonsillectomy; Nasal polyp surgery; Cataract removal with implant; Ankle fracture surgery (Right, 03/02/2018); Ankle surgery (Right, 03/02/2018); and open tx trimalleolar ankle fx w fix pst lip (Right, 3/2/2018).      Home Medications:    Prior to Admission medications    Medication Sig Start Date End Date pruritis. · Neurological: No headache, diplopia, change in muscle strength, numbness or tingling. No change in gait, balance, coordination, mood, affect, memory, mentation, behavior. · Psychiatric: No anxiety, or depression. · Endocrine: No temperature intolerance. No excessive thirst, fluid intake, or urination. No tremor. · Hematologic/Lymphatic: No abnormal bruising or bleeding, blood clots or swollen lymph nodes. · Allergic/Immunologic: No nasal congestion or hives. PHYSICAL EXAM:    Physical Examination:    BP (!) 141/58   Pulse 79   Temp 98.6 °F (37 °C) (Axillary)   Resp 20   Wt 200 lb (90.7 kg)   SpO2 97%   BMI 32.76 kg/m²    Constitutional and General Appearance: alert, cooperative, no distress and appears stated age  HEENT: PERRL, no cervical lymphadenopathy. No masses palpable. Normal oral mucosa  Respiratory:  · Normal excursion and expansion without use of accessory muscles  · Resp Auscultation: Good respiratory effort. No for increased work of breathing. On auscultation: Clear to auscultation. Cardiovascular:  · The apical impulse is not displaced  · Heart tones are crisp and normal. regular S1 and S2. Murmurs: None. · Jugular venous pulsation Normal  · The carotid upstroke is normal in amplitude and contour without delay or bruit  · Peripheral pulses are symmetrical and full   Abdomen:  · No masses or tenderness  · Bowel sounds present  Extremities:  ·  No Cyanosis or Clubbing  ·  Lower extremity edema: No.  ·  Skin: Warm and dry  Neurological:  · Alert and oriented. · Moves all extremities well  · No abnormalities of mood, affect, memory, mentation, or behavior are noted    DATA:    Diagnostics:      EKG: normal EKG, normal sinus rhythm, nonspecific ST and T waves changes, unchanged from previous tracings. ECHO 3/10/14: EF 55%, reduced LV diastolic compliance, mild MR.      Labs:     CBC:   Recent Labs     01/24/21  1230 01/25/21  0506   WBC 6.2 8.0   HGB 13.0 12.2   HCT 39.8 38.9  296     BMP:   Recent Labs     01/24/21  1230 01/25/21  0506   * 143   K 4.4 3.7   CO2 22 19*   BUN 14 14   CREATININE 0.92* 1.02*   LABGLOM 59* 52*   GLUCOSE 105* 100*     FASTING LIPID PANEL:  Lab Results   Component Value Date    HDL 79 01/25/2021    TRIG 92 01/25/2021     LIVER PROFILE:  Recent Labs     01/25/21  0506   AST 23   ALT 11   LABALBU 3.9       Patient Active Problem List   Diagnosis    Closed fracture of shaft of right fibula    Ankle syndesmosis disruption, right, initial encounter    Closed high lateral malleolus fracture, right, initial encounter    Closed fracture of distal lateral malleolus of right ankle    Paroxysmal supraventricular tachycardia (HCC)    Mixed hyperlipidemia    Acute heart failure (HCC)    Elevated troponin    Anxiety       IMPRESSION:      1. Anxiety likely related to benzodiazepine withdrawal   2. Reported episode of SVT, no rhythm strips available   3. CXR with effusion and congestion   4. HLP     RECOMMENDATIONS:  1. Agree with stress test and echo   2. Start Metoprolol 25 mg PO BID   3. Holter monitor on discharge   4. Will need outpatient follow up in 2 weeks with [facility]     Discussed with patient and nursing.     Electronically signed by Stanley Titus MD on 1/25/2021 at 12:08 PM      [facility]  [phone]

## 2021-01-25 NOTE — PROGRESS NOTES
CLINICAL PHARMACY NOTE: MEDS TO [address] [facility] Patient?: No  Total # of Prescriptions Filled: 2   The following medications were delivered to the patient:  · Lopressor 25mg tablet  · Xanax 0.25mg tablet  Total # of Interventions Completed: 0  Time Spent (min): 0    Additional Documentation: meds delivered to the pt in the pt room on 01.25.21 at 17:10.

## 2021-01-25 NOTE — PROCEDURES
[facility]                              CARDIAC STRESS TEST    PATIENT NAME: Erin Nurse                  :        1940  MED REC NO:   [id number]                             ROOM:       5559  ACCOUNT NO:   [de-identified]                           ADMIT DATE: 2021  PROVIDER:     Nayeli Hdez    corrected date of service 2021 amm    DATE OF STUDY:  2021    [facility]  PRIMARY CARE PROVIDER:  Torie Gannon MD  INTERPRETING PHYSICIAN:  Nayeli Hdez MD    LEXISCDENISE STRESS STUDY:  Indication:  chest pain  Procedure explained and consent signed    Medications:  Lexiscan, 0.4 mg  Resting heart rate:  81 bpm  Resting blood pressure:  143/67 mm/Hg  Infusion heart rate:  85 bpm  Infusion blood pressure:  150/53 mm/Hg  Resting EKG:  Abnormal (no significant change)  Stress heart response:  Normal response  Stress BP response:  Appropriate  Chest discomfort:  None  Ischemic EKG changes:  None    IMPRESSION:  Electrocardiographically negative Lexiscan stress study. Radio-isotope  results to follow from the department of Nuclear Medicine.               Erma Fitch    D: 2021 12:54:57       T: 2021 12:57:05     AK/KP  Job#: [id number]     Doc#: Unknown

## 2021-01-25 NOTE — FLOWSHEET NOTE
Holter Monitor was applied as ordered. Monitor is to be worn  for 48 hrs. Written and verbal instructions were given. [address].

## 2021-01-25 NOTE — PROGRESS NOTES
tachycardia ([address] Patient came to emergency room with palpitations and anxiety. She also reported fatigue and had difficulty getting out of bed. Patient denied chest pain, shortness of breath, nausea, vomiting, cough. She also reported dizziness with increased heart rate. Patient tried relaxation exercises and heart rate returned normal.  Evaluation emergency room showed sodium 133, creatinine 0.9, troponin 32, D-dimer 0.7. CT chest was negative for PE. Chest x-ray showed small left pleural effusion. Moderate vascular congestion. Patient was transferred to our facility for further testing and cardiology evaluation. PMH:  Past Medical History:   Diagnosis Date    Anxiety     Asthma     Depression     Hyperlipidemia     Neuropathy 2014    bottom of feet      Allergies: No Known Allergies   Medications :      metoprolol tartrate, 25 mg, Oral, BID      aspirin, 81 mg, Oral, BID      gabapentin, 200 mg, Oral, Nightly      cetirizine, 10 mg, Oral, Daily      LORazepam, 0.5 mg, Oral, BID      traZODone, 150 mg, Oral, Nightly      venlafaxine, 150 mg, Oral, Daily      sodium chloride flush, 10 mL, Intravenous, 2 times per day      enoxaparin, 30 mg, Subcutaneous, BID      atorvastatin, 40 mg, Oral, Nightly        Review of Systems   Review of Systems   Constitutional: Negative for appetite change, fatigue, fever and unexpected weight change. HENT: Negative for congestion, rhinorrhea and sneezing. Eyes: Negative for visual disturbance. Respiratory: Negative for cough, chest tightness, shortness of breath and wheezing. Cardiovascular: Negative for chest pain and palpitations. Gastrointestinal: Negative for abdominal pain, blood in stool, constipation, diarrhea, nausea and vomiting. Genitourinary: Negative for dysuria, enuresis, frequency and hematuria. Musculoskeletal: Negative for arthralgias and myalgias. Skin: Negative for rash.    Neurological: Negative for dizziness, weakness, light-headedness and headaches. Hematological: Does not bruise/bleed easily. Psychiatric/Behavioral: Negative for dysphoric mood and sleep disturbance. Objective :      Current Vitals : Temp: 98.6 °F (37 °C),  Pulse: 79, Resp: 20, BP: (!) 141/58, SpO2: 97 %  Last 24 Hrs Vitals   Patient Vitals for the past 24 hrs:   BP Temp Temp src Pulse Resp SpO2   01/25/21 0846 (!) 141/58 98.6 °F (37 °C) Axillary 79 20 97 %   01/24/21 2045 (!) 151/85 98.4 °F (36.9 °C) Oral 95 25 --   01/24/21 1924 (!) 174/114 -- -- 89 25 --   01/24/21 1716 -- -- -- 89 19 --   01/24/21 1714 (!) 156/93 -- -- 89 26 96 %     Intake / output   01/24 1501 - 01/25 1500  In: 480 [P.O.:480]  Out: -   Physical Exam:  Physical Exam  Vitals signs and nursing note reviewed. Constitutional:       General: She is not in acute distress. Appearance: She is not diaphoretic. HENT:      Head: Normocephalic and atraumatic. Nose:      Right Sinus: No maxillary sinus tenderness or frontal sinus tenderness. Left Sinus: No maxillary sinus tenderness or frontal sinus tenderness. Mouth/Throat:      Pharynx: No oropharyngeal exudate. Eyes:      General: No scleral icterus. Conjunctiva/sclera: Conjunctivae normal.      Pupils: Pupils are equal, round, and reactive to light. Neck:      Musculoskeletal: Full passive range of motion without pain and neck supple. Thyroid: No thyromegaly. Vascular: No JVD. Cardiovascular:      Rate and Rhythm: Normal rate and regular rhythm. Pulses:           Dorsalis pedis pulses are 2+ on the right side and 2+ on the left side. Heart sounds: Normal heart sounds. No murmur. Pulmonary:      Effort: Pulmonary effort is normal.      Breath sounds: Normal breath sounds. No wheezing or rales. Abdominal:      Palpations: Abdomen is soft. There is no mass. Tenderness: There is no abdominal tenderness.    Lymphadenopathy:      Head:      Right side of head: No submandibular adenomas right adrenal gland, incompletely characterized on this exam.  Correlation with noncontrast CT abdomen recommended no sooner than 48 hours following IV contrast administration. 4. Benign left adrenal adenoma. 5. Mild cardiomegaly. 6. Goitrous appearance right thyroid lobe. Clinical Course : stable  Assessment and Plan  :        Palpitations likely anxiety -Xanax as needed. Follow-up with PCP. Acute CHF ruled out. Echo shows preserved ejection fraction. Stress test negative. CT chest negative for PE. Benzodiazepine withdrawal   MILLIE -continue Effexor, trazodone. Hyperlipidemia-on Crestor. Discharge home      Plan and updates discussed with patient ,  answers  explained to satisfaction.    Plan discussed with Staff Diane Garcia RN     (Please note that portions of this note were completed with a voice recognition program. Efforts were made to edit the dictations but occasionally words are mis-transcribed.)      Uche Santos MD  1/25/2021

## 2021-01-25 NOTE — ED NOTES
Report given to [address] [facility]. Patient transferred from ED without incident.      Roel Botello RN  01/24/21 1923

## 2021-01-26 LAB
EKG ATRIAL RATE: 83 BPM
EKG P-R INTERVAL: 252 MS
EKG Q-T INTERVAL: 384 MS
EKG QRS DURATION: 72 MS
EKG QTC CALCULATION (BAZETT): 451 MS
EKG R AXIS: -15 DEGREES
EKG T AXIS: 52 DEGREES
EKG VENTRICULAR RATE: 83 BPM

## 2021-01-26 PROCEDURE — 93010 ELECTROCARDIOGRAM REPORT: CPT | Performed by: INTERNAL MEDICINE

## 2021-01-26 NOTE — DISCHARGE SUMMARY
[facility]  Office: [phone]  Rolene Evelio Griffin DO, Janee Cain MD, Darcy Jeronimo MD, Daxa Ruffin MD, Tom Sloan MD, Anil Gonzales MD, Tracie Marinelli MD, Kiara Hein MD, Sharyle Haff MD, Shreyas Guerrero MD, Luisa Ford DO, Yael Connell MD, Ebony London MD, Good Han DO, Chari Soto MD,  Josh Lea MD, Alphonso Rodrigues MD, Jacobo Peters CNP, [facility] CNP, Juanpaul Mastersippel CNP, Brooke Kang CNS, Claudia Yingels CNP, Ericka Rizvi CNP, Gilberto Distel CNP, Annel Job CNP, Jefe Posada CNP, Lisa Martin PA-C, Sharita Savage CNP, Natalie Lack CNP, Kelton Hirjeancarlos CNP, Janeen Beverage CNP, Regine Ritter CNP, Precious Stroud, [address] [place] [address]      Discharge Summary     Patient ID: Ammy Pemberton  :  1940   MRN: [id number]     ACCOUNT:  [de-identified]   [facility]  Patient's PCP: Lesly Burks MD  Admit Date: 2021   Discharge Date: 2021     Length of Stay: 1  Code Status:  Prior  Admitting Physician: Daxa Ruffin MD  Discharge Physician: Daxa Ruffin MD     Active Discharge Diagnosis :     Primary Problem  Paroxysmal supraventricular tachycardia [facility])      [facility]    Diagnosis Date Noted    Paroxysmal supraventricular tachycardia ([facility] [I47.1] 2021    Mixed hyperlipidemia [E78.2] 2021    Elevated troponin [R77.8] 2021    Anxiety [F41.9] 2021       Admission Condition:  fair     Discharged Condition: stable    Hospital Stay:     Hospital Course:  Ammy Pemberton is a [de-identified] y.o. female who was admitted for the management of   Paroxysmal supraventricular tachycardia ([facility] , presented to ER with Anxiety  Patient came to emergency room with palpitations and anxiety. She also reported fatigue and had difficulty getting out of bed.   Patient denied chest pain, shortness of breath, nausea, vomiting, cough. She also reported dizziness with increased heart rate. Patient tried relaxation exercises and heart rate returned normal.  Evaluation emergency room showed sodium 133, creatinine 0.9, troponin 32, D-dimer 0.7. CT chest was negative for PE. Chest x-ray showed small left pleural effusion. Moderate vascular congestion. Patient was transferred to our facility for further testing and cardiology evaluation. Patient had echo which showed preserved EF. Stress test was negative . She was recommended to follow with cardiology as outpatient . Xanax given as needed for anxiety.            Significant therapeutic interventions:   Palpitations likely anxiety -Xanax as needed. Follow-up with PCP. Acute CHF ruled out. Echo shows preserved ejection fraction. Stress test negative. CT chest negative for PE. Holter given at discharge,   Benzodiazepine withdrawal   MILLIE -continue Effexor, trazodone.   Hyperlipidemia-on Crestor.       Significant Diagnostic Studies:   Labs / Micro:/Radiology  Recent Labs     01/25/21  0506 01/24/21  1230   WBC 8.0 6.2   HGB 12.2 13.0   HCT 38.9 39.8   MCV 86.1 83.1    328     Labs Renal Latest Ref Rng & Units 1/25/2021 1/24/2021 3/14/2018 3/3/2018 3/1/2018   BUN 8 - 23 mg/dL 14 14 14 16 17   Cr 0.50 - 0.90 mg/dL 1.02(H) 0.92(H) 0.72 0.82 0.88   K 3.7 - 5.3 mmol/L 3.7 4.4 4.3 4.3 4.1   Na 135 - 144 mmol/L 143 133(L) 149(H) 142 144     Lab Results   Component Value Date    ALT 11 01/25/2021    AST 23 01/25/2021    ALKPHOS 86 01/25/2021    BILITOT 0.56 01/25/2021     No results found for: TSHFT4, TSH  No results found for: HAV, HEPAIGM, HEPBIGM, HEPBCAB, HBEAG, HEPCAB  Lab Results   Component Value Date    COLORU YELLOW 01/24/2021    NITRU NEGATIVE 01/24/2021    GLUCOSEU NEGATIVE 01/24/2021    KETUA TRACE 01/24/2021    UROBILINOGEN Normal 01/24/2021    BILIRUBINUR NEGATIVE 01/24/2021     Lab Results   Component Value Date    LABA1C 5.6 01/25/2021     Lab Results Component Value Date     01/25/2021     No results found for: INR, PROTIME    Xr Chest Portable    Result Date: 1/24/2021  1. Suspected small left-sided pleural effusion and left basilar atelectasis. Follow-up recommended to document resolution. 2. Mild cardiomegaly. Mild pulmonary vascular congestion. Ct Chest Pulmonary Embolism W Contrast    Result Date: 1/24/2021  1. No pulmonary embolism. 2. No acute pulmonary disease. 3. Probably benign adrenal adenomas right adrenal gland, incompletely characterized on this exam.  Correlation with noncontrast CT abdomen recommended no sooner than 48 hours following IV contrast administration. 4. Benign left adrenal adenoma. 5. Mild cardiomegaly. 6. Goitrous appearance right thyroid lobe. Nm Myocardial Spect Rest Exercise Or Rx    Result Date: 1/25/2021  1. No definitive scintigraphic evidence for reversible ischemia or infarct. 2. Left ventricular ejection fraction of greater than 70%. 3.  Please see report for EKG portion of the examination which will be performed separately by physician from cardiology. Risk stratification:  Low risk Note:  Risk stratification incorporates both clinical history and test results. Final risk determination is the responsibility of the ordering provider as history and other test results may increase or decrease the risk stratification reported for this examination. Risk stratification criteria are adapted from \"Noninvasive Risk Stratification\" criteria from [employer]. Al, ACC/AATS/AHA/ASE/ASNC/SCAI/SCCT/STS 2017 Appropriate Use Criteria For Coronary Revascularization in Patients With Stable Ischemic Heart Disease [facility] Volume 69, Issue 17, May 2017 High risk (>3% annual death or MI) 1. Severe resting LV dysfunction (LVEF >35%) not readily explained by non coronary causes 2. Resting perfusion abnormalities greater than 10% of the myocardium in patients without prior history or evidence of MI 3.   Stress-induced perfusion abnormalities encumbering greater than or equal to 10% myocardium or stress segmental scores indicating multiple vascular territories with abnormalities 4. Stress-induced LV dilatation (TID ratio greater than 1.19 for exercise and greater than 1.39 for regadenoson) Intermediate risk (1% to 3% annual death or MI) 1. Mild/moderate resting LV dysfunction (LVEF 35% to 49%) not readily explained by non coronary causes. 2.  Resting perfusion abnormalities in 5%-9.9% of the myocardium in patients without a history or prior evidence of MI 3. Stress-induced perfusion abnormality encumbering 5%-9.9% of the myocardium or stress segmental scores indicating 1 vascular territory with abnormalities but without LV dilation 4. Small wall motion abnormality involving 1-2 segments and only 1 coronary bed. Low Risk (Less than 1% annual death or MI) 1. Normal or small myocardial perfusion defect at rest or with stress encumbering less than 5% of the myocardium. Consultations:    Consults:     Final Specialist Recommendations/Findings:   IP CONSULT TO SOCIAL WORK  IP CONSULT TO CARDIOLOGY      The patient was seen and examined on day of discharge and this discharge summary is in conjunction with any daily progress note from day of discharge. Discharge plan:     Disposition: Home    Physician Follow Up:     Camila Elizalde MD  [address]  [phone]    Schedule an appointment as soon as possible for a visit in 1 week         Requiring Further Evaluation/Follow Up POST HOSPITALIZATION/Incidental Findings: f/u with cardiology for Holter     Diet: regular diet    Activity: As tolerated. Instructions to Patient: medication as needed,     Discharge Medications:      Medication List      START taking these medications    ALPRAZolam 0.25 MG tablet  Commonly known as: Xanax  Take 1 tablet by mouth 3 times daily as needed for Anxiety for up to 5 days.      metoprolol tartrate 25 MG tablet  Commonly known as: LOPRESSOR  Take 1 tablet by mouth 2 times daily        CONTINUE taking these medications    aspirin 81 MG chewable tablet  Take 1 tablet by mouth 2 times daily     Claritin 10 MG capsule  Generic drug: loratadine     CRESTOR PO     docusate sodium 100 MG capsule  Commonly known as: Colace  Take 1 capsule by mouth 2 times daily as needed for Constipation     gabapentin 100 MG capsule  Commonly known as: NEURONTIN     LORazepam 0.5 MG tablet  Commonly known as: ATIVAN     Misc. Devices Misc  Lateral  bracefor right knee     Scooter Misc  by Does not apply route Knee scooter     traZODone 150 MG tablet  Commonly known as: DESYREL     venlafaxine 37.5 MG tablet  Commonly known as: EFFEXOR           Where to Get Your Medications      These medications were sent to [facility] [address], [place] [address]    Phone: [phone]   ALPRAZolam 0.25 MG tablet  metoprolol tartrate 25 MG tablet         Time Spent on discharge is  33 mins in patient examination, evaluation, counseling as well as medication reconciliation, prescriptions for required medications, discharge plan and follow up. Electronically signed by   Daxa Ruffin MD  1/26/2021        Thank you Dr. Lesly Burks MD for the opportunity to be involved in this patient's care.

## 2021-02-02 NOTE — PROCEDURES
[address] [facility]                                 HOLTER MONITOR    PATIENT NAME: Maggy Parsons                  :        1940  MED REC NO:   [id number]                             ROOM:       2873  ACCOUNT NO:   [de-identified]                           ADMIT DATE: 2021  PROVIDER:     Skyla Salinas    HOLTER MONITOR 48-HOURS    DATE OF STUDY:  2021  ORDERING PROVIDER:  Matteo Remy  PRIMARY CARE PROVIDER: Jakub Zazueta  INDICATION:  PSVT    COMMENTS:  The patient appeared to remain in sinus rhythm with first  degree AV block throughout the recording. Sinus bradycardia and sinus  tachycardia noted. Moderately frequent PAC's with pairs were noted. Non-sustained PAT was noted with the longest lasting 4-beats in duration  and the fastest peaking at 128 bpm.  Rare PVC's were noted. The patient  events were noted. IMPRESSION:  1. Sinus rhythm with first degree AV block. 2.  Sinus bradycardia and sinus tachycardia. 3.  Moderately frequent PAC's with pairs. 4.  Non-sustained PAT. 5.  Rare PVC's.       Myrna Counter    D: 2021 12:25:35       T: 2021 12:26:57     AS/NATIVIDAD  Job#: [id number]     Doc#: Unknown

## 2021-02-23 PROBLEM — R79.89 ELEVATED TROPONIN: Status: RESOLVED | Noted: 2021-01-24 | Resolved: 2021-02-23

## 2021-02-23 PROBLEM — R77.8 ELEVATED TROPONIN: Status: RESOLVED | Noted: 2021-01-24 | Resolved: 2021-02-23

## 2022-05-12 LAB
BASOPHILS ABSOLUTE: 0.1 /ΜL
BASOPHILS RELATIVE PERCENT: 1.2 %
BUN BLDV-MCNC: 18 MG/DL
CALCIUM SERPL-MCNC: 9.1 MG/DL
CHLORIDE BLD-SCNC: 107 MMOL/L
CO2: 30 MMOL/L
CREAT SERPL-MCNC: 0.9 MG/DL
EOSINOPHILS ABSOLUTE: 0.9 /ΜL
EOSINOPHILS RELATIVE PERCENT: 13 %
GFR CALCULATED: 60
GLUCOSE BLD-MCNC: 86 MG/DL
HCT VFR BLD CALC: 34.7 % (ref 36–46)
HEMOGLOBIN: 11.2 G/DL (ref 12–16)
LYMPHOCYTES ABSOLUTE: 1 /ΜL
LYMPHOCYTES RELATIVE PERCENT: 14.5 %
MCH RBC QN AUTO: 27.3 PG
MCHC RBC AUTO-ENTMCNC: 32.1 G/DL
MCV RBC AUTO: 85 FL
MONOCYTES ABSOLUTE: 0.8 /ΜL
MONOCYTES RELATIVE PERCENT: 11.8 %
NEUTROPHILS ABSOLUTE: 4.1 /ΜL
NEUTROPHILS RELATIVE PERCENT: 59.5 %
PDW BLD-RTO: 15.6 %
PLATELET # BLD: 215 K/ΜL
PMV BLD AUTO: 9.6 FL
POTASSIUM SERPL-SCNC: 4.7 MMOL/L
RBC # BLD: 4.09 10^6/ΜL
SODIUM BLD-SCNC: 146 MMOL/L
WBC # BLD: 6.9 10^3/ML

## 2022-06-06 ENCOUNTER — OFFICE VISIT (OUTPATIENT)
Dept: PRIMARY CARE CLINIC | Age: 82
End: 2022-06-06
Payer: MEDICARE

## 2022-06-06 VITALS — DIASTOLIC BLOOD PRESSURE: 74 MMHG | SYSTOLIC BLOOD PRESSURE: 122 MMHG | HEART RATE: 70 BPM | OXYGEN SATURATION: 93 %

## 2022-06-06 DIAGNOSIS — R06.2 WHEEZING: Primary | ICD-10-CM

## 2022-06-06 DIAGNOSIS — Z23 NEED FOR VACCINATION: ICD-10-CM

## 2022-06-06 DIAGNOSIS — F33.41 RECURRENT MAJOR DEPRESSIVE DISORDER, IN PARTIAL REMISSION (HCC): ICD-10-CM

## 2022-06-06 DIAGNOSIS — G89.29 CHRONIC LEFT SHOULDER PAIN: ICD-10-CM

## 2022-06-06 DIAGNOSIS — M25.561 CHRONIC PAIN OF RIGHT KNEE: ICD-10-CM

## 2022-06-06 DIAGNOSIS — I10 PRIMARY HYPERTENSION: ICD-10-CM

## 2022-06-06 DIAGNOSIS — H61.23 BILATERAL IMPACTED CERUMEN: ICD-10-CM

## 2022-06-06 DIAGNOSIS — M25.512 CHRONIC LEFT SHOULDER PAIN: ICD-10-CM

## 2022-06-06 DIAGNOSIS — G89.29 CHRONIC PAIN OF RIGHT KNEE: ICD-10-CM

## 2022-06-06 DIAGNOSIS — F03.90 DEMENTIA WITHOUT BEHAVIORAL DISTURBANCE, UNSPECIFIED DEMENTIA TYPE: ICD-10-CM

## 2022-06-06 PROCEDURE — 90677 PCV20 VACCINE IM: CPT | Performed by: NURSE PRACTITIONER

## 2022-06-06 PROCEDURE — 1123F ACP DISCUSS/DSCN MKR DOCD: CPT | Performed by: NURSE PRACTITIONER

## 2022-06-06 PROCEDURE — 99214 OFFICE O/P EST MOD 30 MIN: CPT | Performed by: NURSE PRACTITIONER

## 2022-06-06 PROCEDURE — 69209 REMOVE IMPACTED EAR WAX UNI: CPT | Performed by: NURSE PRACTITIONER

## 2022-06-06 RX ORDER — SENNA PLUS 8.6 MG/1
1 TABLET ORAL DAILY
COMMUNITY
End: 2022-10-17 | Stop reason: SDUPTHER

## 2022-06-06 RX ORDER — FUROSEMIDE 20 MG/1
20 TABLET ORAL DAILY
COMMUNITY
Start: 2022-05-16

## 2022-06-06 RX ORDER — CITALOPRAM 20 MG/1
20 TABLET ORAL DAILY
COMMUNITY

## 2022-06-06 RX ORDER — LOPERAMIDE HYDROCHLORIDE 2 MG/1
2 CAPSULE ORAL 4 TIMES DAILY PRN
COMMUNITY
End: 2022-10-17 | Stop reason: SDUPTHER

## 2022-06-06 RX ORDER — DONEPEZIL HYDROCHLORIDE 5 MG/1
5 TABLET, FILM COATED ORAL NIGHTLY
Qty: 30 TABLET | Refills: 5 | Status: SHIPPED | OUTPATIENT
Start: 2022-06-06

## 2022-06-06 RX ORDER — BUSPIRONE HYDROCHLORIDE 5 MG/1
5 TABLET ORAL 2 TIMES DAILY
COMMUNITY
Start: 2022-05-16

## 2022-06-06 RX ORDER — ALBUTEROL SULFATE 90 UG/1
AEROSOL, METERED RESPIRATORY (INHALATION)
COMMUNITY
Start: 2022-03-18 | End: 2022-10-17 | Stop reason: SDUPTHER

## 2022-06-06 RX ORDER — CHOLECALCIFEROL (VITAMIN D3) 125 MCG
5 CAPSULE ORAL DAILY
COMMUNITY

## 2022-06-06 RX ORDER — ATORVASTATIN CALCIUM 20 MG/1
20 TABLET, FILM COATED ORAL DAILY
COMMUNITY
Start: 2022-05-16

## 2022-06-06 RX ORDER — ACETAMINOPHEN 325 MG/1
650 TABLET ORAL EVERY 6 HOURS PRN
COMMUNITY

## 2022-06-06 RX ORDER — MEMANTINE HYDROCHLORIDE 10 MG/1
10 TABLET ORAL 2 TIMES DAILY
COMMUNITY
Start: 2021-09-02 | End: 2022-10-17

## 2022-06-06 RX ORDER — HYDROCODONE BITARTRATE AND ACETAMINOPHEN 5; 325 MG/1; MG/1
1 TABLET ORAL EVERY 6 HOURS PRN
COMMUNITY

## 2022-06-06 RX ORDER — DIVALPROEX SODIUM 125 MG/1
125 TABLET, DELAYED RELEASE ORAL 2 TIMES DAILY
COMMUNITY
Start: 2022-05-16

## 2022-06-06 SDOH — ECONOMIC STABILITY: FOOD INSECURITY: WITHIN THE PAST 12 MONTHS, THE FOOD YOU BOUGHT JUST DIDN'T LAST AND YOU DIDN'T HAVE MONEY TO GET MORE.: NEVER TRUE

## 2022-06-06 SDOH — ECONOMIC STABILITY: FOOD INSECURITY: WITHIN THE PAST 12 MONTHS, YOU WORRIED THAT YOUR FOOD WOULD RUN OUT BEFORE YOU GOT MONEY TO BUY MORE.: NEVER TRUE

## 2022-06-06 ASSESSMENT — PATIENT HEALTH QUESTIONNAIRE - PHQ9
8. MOVING OR SPEAKING SO SLOWLY THAT OTHER PEOPLE COULD HAVE NOTICED. OR THE OPPOSITE, BEING SO FIGETY OR RESTLESS THAT YOU HAVE BEEN MOVING AROUND A LOT MORE THAN USUAL: 0
2. FEELING DOWN, DEPRESSED OR HOPELESS: 2
4. FEELING TIRED OR HAVING LITTLE ENERGY: 2
6. FEELING BAD ABOUT YOURSELF - OR THAT YOU ARE A FAILURE OR HAVE LET YOURSELF OR YOUR FAMILY DOWN: 0
SUM OF ALL RESPONSES TO PHQ9 QUESTIONS 1 & 2: 2
SUM OF ALL RESPONSES TO PHQ QUESTIONS 1-9: 4
10. IF YOU CHECKED OFF ANY PROBLEMS, HOW DIFFICULT HAVE THESE PROBLEMS MADE IT FOR YOU TO DO YOUR WORK, TAKE CARE OF THINGS AT HOME, OR GET ALONG WITH OTHER PEOPLE: 1
1. LITTLE INTEREST OR PLEASURE IN DOING THINGS: 0
3. TROUBLE FALLING OR STAYING ASLEEP: 0
SUM OF ALL RESPONSES TO PHQ QUESTIONS 1-9: 4
9. THOUGHTS THAT YOU WOULD BE BETTER OFF DEAD, OR OF HURTING YOURSELF: 0
7. TROUBLE CONCENTRATING ON THINGS, SUCH AS READING THE NEWSPAPER OR WATCHING TELEVISION: 0
5. POOR APPETITE OR OVEREATING: 0
SUM OF ALL RESPONSES TO PHQ QUESTIONS 1-9: 4
SUM OF ALL RESPONSES TO PHQ QUESTIONS 1-9: 4

## 2022-06-06 ASSESSMENT — ENCOUNTER SYMPTOMS
VOMITING: 0
NAUSEA: 0
ABDOMINAL PAIN: 0
WHEEZING: 0
SORE THROAT: 0
EYE REDNESS: 0
EYE DISCHARGE: 0
RHINORRHEA: 0
COUGH: 0
DIARRHEA: 0
SHORTNESS OF BREATH: 0

## 2022-06-06 ASSESSMENT — SOCIAL DETERMINANTS OF HEALTH (SDOH): HOW HARD IS IT FOR YOU TO PAY FOR THE VERY BASICS LIKE FOOD, HOUSING, MEDICAL CARE, AND HEATING?: NOT HARD AT ALL

## 2022-06-06 NOTE — PROGRESS NOTES
[address] [facility]  [facility] [address]  [address] [place] [address]  Dept: [phone]    Leila Gaston is a 80 y.o. female Established patient, who presents today for her medical conditions/complaints as noted below. Chief Complaint   Patient presents with    New Patient       HPI:     HPI  She was treated in skilled nursing after abdominal surgery. She received therapy and then transferred to [place]. Denies diarrhea, constipation, nausea    She is having some right knee and left shoulder pain. She does not want to do therapy. She does not ask for pain medication. She will not go out of her room. Reviewed prior notes:     Reviewed previous:  Labs and point click care    LDL Cholesterol (mg/dL)   Date Value   2021 60       (goal LDL is <100)   AST (U/L)   Date Value   2021 23     ALT (U/L)   Date Value   2021 11     BUN (mg/dL)   Date Value   2022 18     Hemoglobin A1C (%)   Date Value   2021 5.6     BP Readings from Last 3 Encounters:   22 122/74   21 (!) 141/58   18 120/66          (goal 120/80)    Past Medical History:   Diagnosis Date    Anxiety     Asthma     Depression     Hyperlipidemia     Neuropathy     bottom of feet      Past Surgical History:   Procedure Laterality Date    ANKLE FRACTURE SURGERY Right 2018    ORIF    ANKLE SURGERY Right 2018    ORIF    APPENDECTOMY      CATARACT REMOVAL WITH IMPLANT      HERNIA REPAIR  2022    NASAL POLYP SURGERY      OPEN TX TRIMALLEOLAR ANKLE FX W FIX PST LIP Right 3/2/2018    ANKLE OPEN REDUCTION INTERNAL FIXATION  (SYNTHES, NSA=SPINAL VS. GENERAL, 3080 TABLE, C-ARM) performed by Alex Williamson DO at [address] [place] [address]         History reviewed. No pertinent family history.     Social History     Tobacco Use    Smoking status: Former Smoker     Quit date: 1982     Years since quittin.8    Smokeless tobacco: Never Used   Substance Use Topics    Alcohol use: No      Current Outpatient Medications   Medication Sig Dispense Refill    albuterol sulfate  (90 Base) MCG/ACT inhaler INHALE 2 PUFFS BY MOUTH EVERY 4 TO 6 HOURS AS NEEDED      atorvastatin (LIPITOR) 20 MG tablet Take 20 mg by mouth daily      busPIRone (BUSPAR) 5 MG tablet Take 5 mg by mouth in the morning and at bedtime      citalopram (CELEXA) 20 mg tablet Take 20 mg by mouth daily      divalproex (DEPAKOTE) 125 MG DR tablet Take 125 mg by mouth in the morning and at bedtime      furosemide (LASIX) 20 MG tablet Take 20 mg by mouth daily      memantine (NAMENDA) 10 MG tablet Take 10 mg by mouth 2 times daily      loperamide (IMODIUM A-D) 2 MG capsule Take 2 mg by mouth 4 times daily as needed for Diarrhea      melatonin 5 mg TABS tablet Take 5 mg by mouth daily      HYDROcodone-acetaminophen (NORCO) 5-325 MG per tablet Take 1 tablet by mouth every 6 hours as needed for Pain.  acetaminophen (TYLENOL) 325 mg tablet Take 650 mg by mouth every 6 hours as needed for Pain      diclofenac sodium (VOLTAREN) 1 % GEL Apply topically 2 times daily      senna (SENOKOT) 8.6 MG tablet Take 1 tablet by mouth daily      donepezil (ARICEPT) 5 MG tablet Take 1 tablet by mouth nightly 30 tablet 5    metoprolol tartrate (LOPRESSOR) 25 MG tablet Take 1 tablet by mouth 2 times daily 60 tablet 3    traZODone (DESYREL) 150 MG tablet Take 25 mg by mouth nightly        No current facility-administered medications for this visit.      Allergies   Allergen Reactions    Calcium      Muscle pain       Health Maintenance   Topic Date Due    Annual Wellness Visit (AWV)  Never done    Depression Monitoring  Never done    Shingles vaccine (1 of 2) Never done    DEXA (modify frequency per FRAX score)  Never done    Pneumococcal 65+ years Vaccine (1 - PCV) Never done    COVID-19 Vaccine (3 - Booster for Moderna series) 09/10/2021    Lipids  01/25/2022    Flu vaccine (Season Ended) 09/01/2022    DTaP/Tdap/Td vaccine (3 - Td or Tdap) 02/18/2031    Hepatitis A vaccine  Aged Out    Hepatitis B vaccine  Aged Out    Hib vaccine  Aged Out    Meningococcal (ACWY) vaccine  Aged Out       Subjective:      Review of Systems   Constitutional: Negative for chills and fever. HENT: Positive for hearing loss. Negative for ear discharge, nosebleeds, rhinorrhea and sore throat. Eyes: Negative for discharge and redness. Respiratory: Negative for cough, shortness of breath and wheezing. Cardiovascular: Negative for chest pain and palpitations. Gastrointestinal: Negative for abdominal pain, diarrhea, nausea and vomiting. Genitourinary: Negative for dysuria and frequency. Musculoskeletal: Positive for arthralgias. Negative for myalgias. Right knee and left shoulder pain   Skin: Negative for rash and wound. Neurological: Negative for dizziness, light-headedness and headaches. Psychiatric/Behavioral: Positive for confusion. Negative for dysphoric mood and sleep disturbance. The patient is nervous/anxious. Objective:     /74   Pulse 70   SpO2 93%   Physical Exam  Vitals and nursing note reviewed. Constitutional:       General: She is not in acute distress. Appearance: She is well-developed. She is not ill-appearing. HENT:      Head: Normocephalic and atraumatic. Right Ear: External ear normal.      Left Ear: External ear normal.   Eyes:      General: No scleral icterus. Right eye: No discharge. Left eye: No discharge. Conjunctiva/sclera: Conjunctivae normal.   Neck:      Thyroid: No thyromegaly. Trachea: No tracheal deviation. Cardiovascular:      Rate and Rhythm: Normal rate and regular rhythm. Heart sounds: Normal heart sounds. Pulmonary:      Effort: Pulmonary effort is normal. No respiratory distress. Breath sounds: Normal breath sounds. No wheezing.    Abdominal:      General: Bowel sounds are normal. Palpations: Abdomen is soft. Comments: 3 cm hard area in epigastric area likely is scar tissue   Musculoskeletal:      Left shoulder: Bony tenderness present. Decreased range of motion. Right knee: Bony tenderness and crepitus present. Decreased range of motion. Left knee: Bony tenderness present. Lymphadenopathy:      Cervical: No cervical adenopathy. Skin:     General: Skin is warm. Findings: No rash. Neurological:      Mental Status: She is alert and oriented to person, place, and time. Psychiatric:         Mood and Affect: Mood normal.         Behavior: Behavior normal.         Thought Content: Thought content normal.         Assessment/Plan:   1. Wheezing  -     XR CHEST STANDARD (2 VW); Future  2. Dementia without behavioral disturbance, unspecified dementia type (HCC)  -     donepezil (ARICEPT) 5 MG tablet; Take 1 tablet by mouth nightly, Disp-30 tablet, R-5Normal  3. Chronic pain of right knee  4. Chronic left shoulder pain  5. Primary hypertension  6. Recurrent major depressive disorder, in partial remission ([facility] [address]  7. Need for vaccination  -     Pneumococcal, PCV20, PREVNAR 20, (age 25 yrs+), IM, PF  8. Bilateral impacted cerumen  -     CT REMOVAL IMPACTED CERUMEN IRRIGATION/LVG UNILAT     Start donepezil 5 mg nightly  Chest x-ray 2 view  Pneumonia vaccine given today  Return in about 4 months (around 10/6/2022) for HTN, memory.   Data Unavailable     Orders Placed This Encounter   Procedures    XR CHEST STANDARD (2 VW)     Standing Status:   Future     Standing Expiration Date:   6/6/2023     Order Specific Question:   Reason for exam:     Answer:   wheezing    Pneumococcal, PCV20, PREVNAR 21, (age 25 yrs+), IM, PF    CT REMOVAL IMPACTED CERUMEN IRRIGATION/LVG UNILAT     Orders Placed This Encounter   Medications    donepezil (ARICEPT) 5 MG tablet     Sig: Take 1 tablet by mouth nightly     Dispense:  30 tablet     Refill:  5       Patient given educational materials - see patient instructions. Discussed use, benefit, and side effects of prescribed medications. All patient questions answered. Pt voiced understanding. Reviewed health maintenance. Instructed to continue current medications, diet and exercise. Patient agreed with treatment plan. Follow up as directed.      Electronically signed by JOHN Villaseñor CNP on 6/6/2022 at 4:43 PM

## 2022-07-18 ENCOUNTER — TELEPHONE (OUTPATIENT)
Dept: PRIMARY CARE CLINIC | Age: 82
End: 2022-07-18

## 2022-07-18 DIAGNOSIS — R06.2 WHEEZING: ICD-10-CM

## 2022-07-18 NOTE — TELEPHONE ENCOUNTER
----- Message from Leta Ramirez sent at 7/18/2022  9:42 AM EDT -----  Subject: Message to Provider    QUESTIONS  Information for Provider? Patient's daughter Kimberley Reeder is calling about   x-ray results. Please call her back.  Thanks.  ---------------------------------------------------------------------------  --------------  Walker LAWS  [phone]; OK to leave message on voicemail  ---------------------------------------------------------------------------  --------------  SCRIPT ANSWERS  undefined

## 2022-07-19 NOTE — TELEPHONE ENCOUNTER
Chest x-ray normal. Heart mildly enlarged and lungs clear. Note: it is helpful if result are under imaging.

## 2022-07-25 ENCOUNTER — TELEPHONE (OUTPATIENT)
Dept: PRIMARY CARE CLINIC | Age: 82
End: 2022-07-25

## 2022-07-25 DIAGNOSIS — R19.7 DIARRHEA, UNSPECIFIED TYPE: Primary | ICD-10-CM

## 2022-07-25 NOTE — TELEPHONE ENCOUNTER
Jose E Alegre from  [facility] calling. Pt having loose stools times 3 days. Can't stay off the toilet. Imodium not working. Please advise.  [phone]

## 2022-07-26 RX ORDER — COLESTIPOL HYDROCHLORIDE 5 G/5G
5 GRANULE, FOR SUSPENSION ORAL 2 TIMES DAILY
Qty: 60 EACH | Refills: 3 | Status: SHIPPED | OUTPATIENT
Start: 2022-07-26 | End: 2022-10-03

## 2022-10-03 DIAGNOSIS — R19.7 DIARRHEA, UNSPECIFIED TYPE: Primary | ICD-10-CM

## 2022-10-03 RX ORDER — ELUXADOLINE 75 MG/1
1 TABLET, FILM COATED ORAL 2 TIMES DAILY
Qty: 60 TABLET | Refills: 2 | Status: SHIPPED | OUTPATIENT
Start: 2022-10-03 | End: 2022-10-17

## 2022-10-10 ENCOUNTER — TELEPHONE (OUTPATIENT)
Dept: PRIMARY CARE CLINIC | Age: 82
End: 2022-10-10

## 2022-10-10 NOTE — TELEPHONE ENCOUNTER
The Viberzi that was sent in is $100 co-pay and she cannot afford that. She was on a liquid before and she refused to take it. She is refusing to take her medications. She will say she takes them all the time but she doesn't. Daughter says she has gotten meaner, she doesn't stay very long when she visits because she started throwing things at her last time she was there. She refuses to let them come in and do housekeeping, but she tells her daughter they just don't come in.

## 2022-10-10 NOTE — TELEPHONE ENCOUNTER
Tawanna Riggins is out of the office this week, he can talk to them next week. Please refer message to him.

## 2022-10-10 NOTE — TELEPHONE ENCOUNTER
----- Message from James Valerie sent at 10/10/2022  1:03 PM EDT -----  Subject: Message to Provider    QUESTIONS  Information for Provider? Patient daughter called in and would like a call   back regarding the patient refusing medications as well as the cost the of   the medications. Patient daughter would like to discuss the diarrhea   medication and the high copay  ---------------------------------------------------------------------------  --------------  Walt Mota INFO  [phone]; OK to leave message on voicemail  ---------------------------------------------------------------------------  --------------  SCRIPT ANSWERS  Relationship to Patient? Other  Representative Name? Jackeline Huang  Is the Representative on the appropriate HIPAA document in Epic?  Yes

## 2022-10-11 NOTE — TELEPHONE ENCOUNTER
Informed Daughter to wait and see if she can get her to go to her appt on Monday. If not A home visit may be an option for her. Advised that her disease state may be progressing and it may be something they should discuss as a family possibly moving her to memory care or discussing with you. She verbalized understanding. Said she would maybe write a list for you to see prior to her appt since she will be with her if she comes at all.

## 2022-10-17 ENCOUNTER — OFFICE VISIT (OUTPATIENT)
Dept: PRIMARY CARE CLINIC | Age: 82
End: 2022-10-17
Payer: MEDICARE

## 2022-10-17 VITALS — OXYGEN SATURATION: 94 % | HEART RATE: 64 BPM | DIASTOLIC BLOOD PRESSURE: 62 MMHG | SYSTOLIC BLOOD PRESSURE: 108 MMHG

## 2022-10-17 DIAGNOSIS — K21.9 GASTROESOPHAGEAL REFLUX DISEASE WITHOUT ESOPHAGITIS: ICD-10-CM

## 2022-10-17 DIAGNOSIS — H61.23 BILATERAL IMPACTED CERUMEN: ICD-10-CM

## 2022-10-17 DIAGNOSIS — R06.2 WHEEZING: Primary | ICD-10-CM

## 2022-10-17 DIAGNOSIS — R19.7 DIARRHEA, UNSPECIFIED TYPE: ICD-10-CM

## 2022-10-17 PROCEDURE — 1123F ACP DISCUSS/DSCN MKR DOCD: CPT | Performed by: NURSE PRACTITIONER

## 2022-10-17 PROCEDURE — 69209 REMOVE IMPACTED EAR WAX UNI: CPT | Performed by: NURSE PRACTITIONER

## 2022-10-17 PROCEDURE — 99214 OFFICE O/P EST MOD 30 MIN: CPT | Performed by: NURSE PRACTITIONER

## 2022-10-17 RX ORDER — IPRATROPIUM BROMIDE AND ALBUTEROL SULFATE 2.5; .5 MG/3ML; MG/3ML
1 SOLUTION RESPIRATORY (INHALATION) 4 TIMES DAILY
Qty: 168 ML | Refills: 0 | Status: SHIPPED | OUTPATIENT
Start: 2022-10-17 | End: 2022-10-31

## 2022-10-17 RX ORDER — ALBUTEROL SULFATE 90 UG/1
AEROSOL, METERED RESPIRATORY (INHALATION)
Qty: 18 G | Refills: 0 | Status: SHIPPED | OUTPATIENT
Start: 2022-10-17

## 2022-10-17 RX ORDER — SENNA PLUS 8.6 MG/1
1 TABLET ORAL EVERY OTHER DAY
Qty: 1 TABLET | Refills: 0 | Status: SHIPPED | OUTPATIENT
Start: 2022-10-17

## 2022-10-17 RX ORDER — LOPERAMIDE HYDROCHLORIDE 2 MG/1
2 CAPSULE ORAL 4 TIMES DAILY PRN
Qty: 30 CAPSULE | Refills: 2 | Status: SHIPPED | OUTPATIENT
Start: 2022-10-17

## 2022-10-17 RX ORDER — CALCIUM CARBONATE 200(500)MG
2 TABLET,CHEWABLE ORAL 3 TIMES DAILY PRN
Qty: 1 TABLET | Refills: 0
Start: 2022-10-17 | End: 2022-11-16

## 2022-10-17 ASSESSMENT — ENCOUNTER SYMPTOMS
CONSTIPATION: 0
WHEEZING: 1
NAUSEA: 0
VOMITING: 0
COUGH: 0
SHORTNESS OF BREATH: 0
DIARRHEA: 1

## 2022-10-17 NOTE — PROGRESS NOTES
[address] [facility]  [address]  [address] [place] [address]  Dept: [phone]    Lesli Goss is a 80 y.o. female Established patient, who presents today for her medical conditions/complaints as noted below. Chief Complaint   Patient presents with    Hypertension       HPI:     HPI  Wheezing all the time. Inhaler may be empty. She has indigestion. Daughter gave us a note letting us know the follow. However the daughter did not want the patient know that she gave us the note. She refuses medication on a regular basis, but states she never refuses meds. She refuses housekeeping, but states they never come in and she has told them to get out. She becomes very nasty and vulgar with family and staff - recently tossed a bag of dog food at daughter. She needs another med for diarrhea she refuses liquid and current med too expensive. She does not leave room. She has remarked she would rather be dead.       Reviewed prior notes:  point click care notes    Reviewed previous:  Imaging    LDL Cholesterol (mg/dL)   Date Value   01/25/2021 60       (goal LDL is <100)   AST (U/L)   Date Value   01/25/2021 23     ALT (U/L)   Date Value   01/25/2021 11     BUN (mg/dL)   Date Value   05/12/2022 18     Hemoglobin A1C (%)   Date Value   01/25/2021 5.6     BP Readings from Last 3 Encounters:   10/17/22 108/62   06/06/22 122/74   01/25/21 (!) 141/58          (goal 120/80)    Past Medical History:   Diagnosis Date    Anxiety     Asthma     Depression     Hyperlipidemia     Neuropathy 2014    bottom of feet      Past Surgical History:   Procedure Laterality Date    ANKLE FRACTURE SURGERY Right 03/02/2018    ORIF    ANKLE SURGERY Right 03/02/2018    ORIF    APPENDECTOMY      CATARACT REMOVAL WITH IMPLANT      HERNIA REPAIR  04/2022    NASAL POLYP SURGERY      OPEN TX TRIMALLEOLAR ANKLE FX W FIX PST LIP Right 3/2/2018    ANKLE OPEN REDUCTION INTERNAL FIXATION  (SYNTHES, NSA=SPINAL VS. GENERAL, 3080 TABLE, C-ARM) performed by Eric Emmanuel DO at [address]         No family history on file. Social History     Tobacco Use    Smoking status: Former     Types: Cigarettes     Quit date: 1982     Years since quittin.1    Smokeless tobacco: Never   Substance Use Topics    Alcohol use: No      Current Outpatient Medications   Medication Sig Dispense Refill    albuterol sulfate HFA (PROVENTIL;VENTOLIN;PROAIR) 108 (90 Base) MCG/ACT inhaler INHALE 2 PUFFS BY MOUTH EVERY 4 TO 6 HOURS AS NEEDED 18 g 0    loperamide (IMODIUM) 2 MG capsule Take 1 capsule by mouth 4 times daily as needed for Diarrhea 30 capsule 2    calcium carbonate (ANTACID) 500 MG chewable tablet Take 2 tablets by mouth 3 times daily as needed for Heartburn 1 tablet 0    ipratropium-albuterol (DUONEB) 0.5-2.5 (3) MG/3ML SOLN nebulizer solution Inhale 3 mLs into the lungs 4 times daily for 14 days 168 mL 0    senna (SENOKOT) 8.6 MG tablet Take 1 tablet by mouth every other day 1 tablet 0    atorvastatin (LIPITOR) 20 MG tablet Take 20 mg by mouth daily      busPIRone (BUSPAR) 5 MG tablet Take 5 mg by mouth in the morning and at bedtime      citalopram (CELEXA) 20 mg tablet Take 20 mg by mouth daily      divalproex (DEPAKOTE) 125 MG DR tablet Take 125 mg by mouth in the morning and at bedtime      furosemide (LASIX) 20 MG tablet Take 20 mg by mouth daily      melatonin 5 mg TABS tablet Take 5 mg by mouth daily      HYDROcodone-acetaminophen (NORCO) 5-325 MG per tablet Take 1 tablet by mouth every 6 hours as needed for Pain.       acetaminophen (TYLENOL) 325 mg tablet Take 650 mg by mouth every 6 hours as needed for Pain      diclofenac sodium (VOLTAREN) 1 % GEL Apply topically 2 times daily      donepezil (ARICEPT) 5 MG tablet Take 1 tablet by mouth nightly 30 tablet 5    metoprolol tartrate (LOPRESSOR) 25 MG tablet Take 1 tablet by mouth 2 times daily 60 tablet 3    traZODone (DESYREL) 150 MG tablet Take 25 mg by mouth nightly        No current facility-administered medications for this visit. Allergies   Allergen Reactions    Calcium      Muscle pain       Health Maintenance   Topic Date Due    Shingles vaccine (1 of 2) Never done    DEXA (modify frequency per FRAX score)  Never done    Annual Wellness Visit (AWV)  Never done    COVID-19 Vaccine (3 - Booster for Moderna series) 09/10/2021    Lipids  01/25/2022    Flu vaccine (1) 08/01/2022    Depression Monitoring  06/06/2023    DTaP/Tdap/Td vaccine (3 - Td or Tdap) 02/18/2031    Pneumococcal 65+ years Vaccine  Completed    Hepatitis A vaccine  Aged Out    Hib vaccine  Aged Out    Meningococcal (ACWY) vaccine  Aged Out       Subjective:      Review of Systems   Constitutional:  Positive for activity change. Negative for chills and fever. HENT:  Negative for congestion, ear pain and nosebleeds. Respiratory:  Positive for wheezing. Negative for cough and shortness of breath. Cardiovascular:  Negative for chest pain, palpitations and leg swelling. Gastrointestinal:  Positive for diarrhea. Negative for constipation, nausea and vomiting. Genitourinary:  Negative for difficulty urinating and dysuria. Musculoskeletal:  Positive for arthralgias and gait problem. Skin:  Negative for rash and wound. Neurological:  Positive for weakness. Negative for dizziness and light-headedness. Psychiatric/Behavioral:  Positive for agitation, behavioral problems, confusion and dysphoric mood. Objective:     /62   Pulse 64   SpO2 94%   Physical Exam  Vitals and nursing note reviewed. Constitutional:       Appearance: Normal appearance. HENT:      Head: Normocephalic and atraumatic. Ears:      Comments: Bilateral cerumen impaction  Cardiovascular:      Rate and Rhythm: Normal rate and regular rhythm. Heart sounds: Normal heart sounds. Pulmonary:      Effort: Pulmonary effort is normal.      Breath sounds: Normal breath sounds. Abdominal:      General: Bowel sounds are normal.      Palpations: Abdomen is soft. Musculoskeletal:      Cervical back: Normal range of motion and neck supple. Right lower leg: No edema. Left lower leg: No edema. Neurological:      Mental Status: She is alert. Motor: Weakness present. Psychiatric:         Mood and Affect: Mood is depressed. Affect is angry. Speech: Speech normal.         Behavior: Behavior is agitated. Cognition and Memory: Memory is impaired. Assessment/Plan:   1. Wheezing  -     albuterol sulfate HFA (PROVENTIL;VENTOLIN;PROAIR) 108 (90 Base) MCG/ACT inhaler; INHALE 2 PUFFS BY MOUTH EVERY 4 TO 6 HOURS AS NEEDED, Disp-18 g, R-0Normal  -     ipratropium-albuterol (DUONEB) 0.5-2.5 (3) MG/3ML SOLN nebulizer solution; Inhale 3 mLs into the lungs 4 times daily for 14 days, Disp-168 mL, R-0Normal  2. Diarrhea, unspecified type  -     loperamide (IMODIUM) 2 MG capsule; Take 1 capsule by mouth 4 times daily as needed for Diarrhea, Disp-30 capsule, R-2Normal  3. Bilateral impacted cerumen  -     OH REMOVAL IMPACTED CERUMEN IRRIGATION/LVG UNILAT  4. Gastroesophageal reflux disease without esophagitis  -     calcium carbonate (ANTACID) 500 MG chewable tablet; Take 2 tablets by mouth 3 times daily as needed for Heartburn, Disp-1 tablet, R-0NO PRINT     DC viberzi  DC namenda  Change senna 8.6 to one tablet every other day  Douneb nebulizer treatment 4x/day X 14 days  Tums PRN  Loperamide 2 mg capsule 4x/day PRN diarrhea  Note: Patient told me see would take medication this week and allow house keeping to clean room please let me know if she does not. Return in about 11 weeks (around 1/2/2023) for AWV.   Data Unavailable     Orders Placed This Encounter   Procedures    OH REMOVAL IMPACTED CERUMEN IRRIGATION/LVG UNILAT     Orders Placed This Encounter   Medications    albuterol sulfate HFA (PROVENTIL;VENTOLIN;PROAIR) 108 (90 Base) MCG/ACT inhaler     Sig: INHALE 2 PUFFS BY MOUTH EVERY 4 TO 6 HOURS AS NEEDED     Dispense:  18 g     Refill:  0    loperamide (IMODIUM) 2 MG capsule     Sig: Take 1 capsule by mouth 4 times daily as needed for Diarrhea     Dispense:  30 capsule     Refill:  2    calcium carbonate (ANTACID) 500 MG chewable tablet     Sig: Take 2 tablets by mouth 3 times daily as needed for Heartburn     Dispense:  1 tablet     Refill:  0    ipratropium-albuterol (DUONEB) 0.5-2.5 (3) MG/3ML SOLN nebulizer solution     Sig: Inhale 3 mLs into the lungs 4 times daily for 14 days     Dispense:  168 mL     Refill:  0    senna (SENOKOT) 8.6 MG tablet     Sig: Take 1 tablet by mouth every other day     Dispense:  1 tablet     Refill:  0       Patient given educational materials - see patient instructions. Discussed use, benefit, and side effects of prescribed medications. All patient questions answered. Pt voiced understanding. Reviewed health maintenance. Instructed to continue current medications, diet and exercise. Patient agreed with treatment plan. Follow up as directed.      Electronically signed by JOHN Burton CNP on 10/17/2022 at 5:21 PM

## 2023-01-16 ENCOUNTER — OFFICE VISIT (OUTPATIENT)
Dept: PRIMARY CARE CLINIC | Age: 83
End: 2023-01-16
Payer: MEDICARE

## 2023-01-16 VITALS — OXYGEN SATURATION: 98 % | SYSTOLIC BLOOD PRESSURE: 118 MMHG | DIASTOLIC BLOOD PRESSURE: 74 MMHG | HEART RATE: 61 BPM

## 2023-01-16 DIAGNOSIS — H61.23 BILATERAL IMPACTED CERUMEN: ICD-10-CM

## 2023-01-16 DIAGNOSIS — Z79.899 LONG TERM CURRENT USE OF THERAPEUTIC DRUG: ICD-10-CM

## 2023-01-16 DIAGNOSIS — F51.01 PRIMARY INSOMNIA: ICD-10-CM

## 2023-01-16 DIAGNOSIS — R06.2 WHEEZING: ICD-10-CM

## 2023-01-16 DIAGNOSIS — I47.1 SUPRAVENTRICULAR TACHYCARDIA (HCC): ICD-10-CM

## 2023-01-16 DIAGNOSIS — Z00.00 INITIAL MEDICARE ANNUAL WELLNESS VISIT: Primary | ICD-10-CM

## 2023-01-16 DIAGNOSIS — G62.9 NEUROPATHY: ICD-10-CM

## 2023-01-16 DIAGNOSIS — R05.3 CHRONIC COUGH: ICD-10-CM

## 2023-01-16 PROBLEM — H90.3 BILATERAL SENSORINEURAL HEARING LOSS: Status: ACTIVE | Noted: 2020-01-28

## 2023-01-16 PROBLEM — R41.3 MEMORY LOSS: Status: ACTIVE | Noted: 2021-07-01

## 2023-01-16 PROBLEM — I10 HYPERTENSION: Status: ACTIVE | Noted: 2023-01-16

## 2023-01-16 PROBLEM — F32.A DEPRESSION: Status: ACTIVE | Noted: 2023-01-16

## 2023-01-16 PROCEDURE — 3074F SYST BP LT 130 MM HG: CPT | Performed by: NURSE PRACTITIONER

## 2023-01-16 PROCEDURE — 69209 REMOVE IMPACTED EAR WAX UNI: CPT | Performed by: NURSE PRACTITIONER

## 2023-01-16 PROCEDURE — 1123F ACP DISCUSS/DSCN MKR DOCD: CPT | Performed by: NURSE PRACTITIONER

## 2023-01-16 PROCEDURE — G0438 PPPS, INITIAL VISIT: HCPCS | Performed by: NURSE PRACTITIONER

## 2023-01-16 PROCEDURE — 3078F DIAST BP <80 MM HG: CPT | Performed by: NURSE PRACTITIONER

## 2023-01-16 RX ORDER — TRAZODONE HYDROCHLORIDE 50 MG/1
50 TABLET ORAL NIGHTLY
Qty: 30 TABLET | Refills: 3 | Status: SHIPPED | OUTPATIENT
Start: 2023-01-16

## 2023-01-16 RX ORDER — ALBUTEROL SULFATE 90 UG/1
AEROSOL, METERED RESPIRATORY (INHALATION)
Qty: 18 G | Refills: 0 | Status: SHIPPED | OUTPATIENT
Start: 2023-01-16

## 2023-01-16 RX ORDER — CALCIUM CARBONATE 200(500)MG
1 TABLET,CHEWABLE ORAL DAILY
COMMUNITY

## 2023-01-16 RX ORDER — IPRATROPIUM BROMIDE AND ALBUTEROL SULFATE 2.5; .5 MG/3ML; MG/3ML
1 SOLUTION RESPIRATORY (INHALATION) 4 TIMES DAILY
Qty: 168 ML | Refills: 0 | Status: SHIPPED | OUTPATIENT
Start: 2023-01-16 | End: 2023-01-30

## 2023-01-16 RX ORDER — GABAPENTIN 100 MG/1
100 CAPSULE ORAL NIGHTLY
Qty: 30 CAPSULE | Refills: 3 | Status: SHIPPED | OUTPATIENT
Start: 2023-01-16 | End: 2023-07-15

## 2023-01-16 ASSESSMENT — PATIENT HEALTH QUESTIONNAIRE - PHQ9
9. THOUGHTS THAT YOU WOULD BE BETTER OFF DEAD, OR OF HURTING YOURSELF: 0
SUM OF ALL RESPONSES TO PHQ QUESTIONS 1-9: 7
2. FEELING DOWN, DEPRESSED OR HOPELESS: 2
10. IF YOU CHECKED OFF ANY PROBLEMS, HOW DIFFICULT HAVE THESE PROBLEMS MADE IT FOR YOU TO DO YOUR WORK, TAKE CARE OF THINGS AT HOME, OR GET ALONG WITH OTHER PEOPLE: 1
7. TROUBLE CONCENTRATING ON THINGS, SUCH AS READING THE NEWSPAPER OR WATCHING TELEVISION: 0
1. LITTLE INTEREST OR PLEASURE IN DOING THINGS: 0
2. FEELING DOWN, DEPRESSED OR HOPELESS: 2
1. LITTLE INTEREST OR PLEASURE IN DOING THINGS: 0
SUM OF ALL RESPONSES TO PHQ QUESTIONS 1-9: 2
SUM OF ALL RESPONSES TO PHQ QUESTIONS 1-9: 2
SUM OF ALL RESPONSES TO PHQ9 QUESTIONS 1 & 2: 2
6. FEELING BAD ABOUT YOURSELF - OR THAT YOU ARE A FAILURE OR HAVE LET YOURSELF OR YOUR FAMILY DOWN: 0
SUM OF ALL RESPONSES TO PHQ QUESTIONS 1-9: 7
SUM OF ALL RESPONSES TO PHQ QUESTIONS 1-9: 2
SUM OF ALL RESPONSES TO PHQ QUESTIONS 1-9: 7
5. POOR APPETITE OR OVEREATING: 0
SUM OF ALL RESPONSES TO PHQ9 QUESTIONS 1 & 2: 2
SUM OF ALL RESPONSES TO PHQ QUESTIONS 1-9: 2
8. MOVING OR SPEAKING SO SLOWLY THAT OTHER PEOPLE COULD HAVE NOTICED. OR THE OPPOSITE, BEING SO FIGETY OR RESTLESS THAT YOU HAVE BEEN MOVING AROUND A LOT MORE THAN USUAL: 0
4. FEELING TIRED OR HAVING LITTLE ENERGY: 3
3. TROUBLE FALLING OR STAYING ASLEEP: 2
SUM OF ALL RESPONSES TO PHQ QUESTIONS 1-9: 7

## 2023-01-16 NOTE — PATIENT INSTRUCTIONS
Learning About Mindfulness for Stress  What are mindfulness and stress? Stress is what you feel when you have to handle more than you are used to. A lot of things can cause stress. You may feel stress when you go on a job interview, take a test, or run a race. This kind of short-term stress is normal and even useful. It can help you if you need to work hard or react quickly. Stress also can last a long time. Long-term stress is caused by stressful situations or events. Examples of long-term stress include long-term health problems, ongoing problems at work, and conflicts in your family. Long-term stress can harm your health. Mindfulness is a focus only on things happening in the present moment. It's a process of purposefully paying attention to and being aware of your surroundings, your emotions, your thoughts, and how your body feels. You are aware of these things, but you aren't judging these experiences as \"good\" or \"bad. \" Mindfulness can help you learn to calm your mind and body to help you cope with illness, pain, and stress. How does mindfulness help to relieve stress? Mindfulness can help quiet your mind and relax your body. Studies show that it can help some people sleep better, feel less anxious, and bring their blood pressure down. And it's been shown to help some people live and cope better with certain health problems like heart disease, depression, chronic pain, and cancer. How do you practice mindfulness? To be mindful is to pay attention, to be present, and to be accepting. When you're mindful, you do just one thing and you pay close attention to that one thing. For example, you may sit quietly and notice your emotions or how your food tastes and smells. When you're present, you focus on the things that are happening right now. You let go of your thoughts about the past and the future. When you dwell on the past or the future, you miss moments that can heal and strengthen you.  You may miss moments like hearing a child laugh or seeing a friendly face when you think you're all alone. When you're accepting, you don't  the present moment. Instead you accept your thoughts and feelings as they come. You can practice anytime, anywhere, and in any way you choose. You can practice in many ways. Here are a few ideas:  While doing your chores, like washing the dishes, let your mind focus on what's in your hand. What does the dish feel like? Is the water warm or cold? Go outside and take a few deep breaths. What is the air like? Is it warm or cold? When you can, take some time at the start of your day to sit alone and think. Take a slow walk by yourself. Count your steps while you breathe in and out. Try yoga breathing exercises, stretches, and poses to strengthen and relax your muscles. At work, if you can, try to stop for a few moments each hour. Note how your body feels. Let yourself regroup and let your mind settle before you return to what you were doing. If you struggle with anxiety or \"worry thoughts,\" imagine your mind as a blue anastasia and your worry thoughts as clouds. Now imagine those worry thoughts floating across your mind's anastasia. Just let them pass by as you watch. Follow-up care is a key part of your treatment and safety. Be sure to make and go to all appointments, and call your doctor if you are having problems. It's also a good idea to know your test results and keep a list of the medicines you take. Where can you learn more? Go to http://www.reyes.com/ and enter M676 to learn more about \"Learning About Mindfulness for Stress. \"  Current as of: February 9, 2022               Content Version: 13.5  © 2006-2022 Healthwise, Incorporated. Care instructions adapted under license by [facility] ([facility]).  If you have questions about a medical condition or this instruction, always ask your healthcare professional. [address] any warranty or liability for your use of this information.      For more information on your local Area Agency on Aging or [place] on Aging please visit the appropriate web site below:    [place]: https://www.eaaa.org/services-programs/    [place]: https://aging.[place].gov/    [place]: https://aging.[place].gov/    [place]: https://www.[place]da.[place].gov/aaamap.htm           Learning About Being Active as an Older Adult  Why is being active important as you get older?     Being active is one of the best things you can do for your health. And it's never too late to start. Being active--or getting active, if you aren't already--has definite benefits. It can:  Give you more energy,  Keep your mind sharp.  Improve balance to reduce your risk of falls.  Help you manage chronic illness with fewer medicines.  No matter how old you are, how fit you are, or what health problems you have, there is a form of activity that will work for you. And the more physical activity you can do, the better your overall health will be.  What kinds of activity can help you stay healthy?  Being more active will make your daily activities easier. Physical activity includes planned exercise and things you do in daily life. There are four types of activity:  Aerobic.  Doing aerobic activity makes your heart and lungs strong.  Includes walking, dancing, and gardening.  Aim for at least 2½ hours spread throughout the week.  It improves your energy and can help you sleep better.  Muscle-strengthening.  This type of activity can help maintain muscle and strengthen bones.  Includes climbing stairs, using resistance bands, and lifting or carrying heavy loads.  Aim for at least twice a week.  It can help protect the knees and other joints.  Stretching.  Stretching gives you better range of motion in joints and muscles.  Includes upper arm stretches, calf stretches, and gentle yoga.  Aim for at least twice a week, preferably after your muscles are warmed up from other activities.  It can  help you function better in daily life. Balancing. This helps you stay coordinated and have good posture. Includes heel-to-toe walking, milton chi, and certain types of yoga. Aim for at least 3 days a week. It can reduce your risk of falling. Even if you have a hard time meeting the recommendations, it's better to be more active than less active. All activity done in each category counts toward your weekly total. You'd be surprised how daily things like carrying groceries, keeping up with grandchildren, and taking the stairs can add up. What keeps you from being active? If you've had a hard time being more active, you're not alone. Maybe you remember being able to do more. Or maybe you've never thought of yourself as being active. It's frustrating when you can't do the things you want. Being more active can help. What's holding you back? Getting started. Have a goal, but break it into easy tasks. Small steps build into big accomplishments. Staying motivated. If you feel like skipping your activity, remember your goal. Maybe you want to move better and stay independent. Every activity gets you one step closer. Not feeling your best.  Start with 5 minutes of an activity you enjoy. Prove to yourself you can do it. As you get comfortable, increase your time. You may not be where you want to be. But you're in the process of getting there. Everyone starts somewhere. How can you find safe ways to stay active? Talk with your doctor about any physical challenges you're facing. Make a plan with your doctor if you have a health problem or aren't sure how to get started with activity. If you're already active, ask your doctor if there is anything you should change to stay safe as your body and health change. If you tend to feel dizzy after you take medicine, avoid activity at that time. Try being active before you take your medicine. This will reduce your risk of falls.   If you plan to be active at home, make sure to clear your space before you get started. Remove things like TV cords, coffee tables, and throw rugs. It's safest to have plenty of space to move freely. The key to getting more active is to take it slow and steady. Try to improve only a little bit at a time. Pick just one area to improve on at first. And if an activity hurts, stop and talk to your doctor. Where can you learn more? Go to http://www.reyes.com/ and enter P600 to learn more about \"Learning About Being Active as an Older Adult. \"  Current as of: October 10, 2022               Content Version: 13.5  © 2006-2022 [employer]. Care instructions adapted under license by [facility] ([facility]). If you have questions about a medical condition or this instruction, always ask your healthcare professional. [place] [address] any warranty or liability for your use of this information. Learning About Dental Care for Older Adults  Dental care for older adults: Overview  Dental care for older people is much the same as for younger adults. But older adults do have concerns that younger adults do not. Older adults may have problems with gum disease and decay on the roots of their teeth. They may need missing teeth replaced or broken fillings fixed. Or they may have dentures that need to be cared for. Some older adults may have trouble holding a toothbrush. You can help remind the person you are caring for to brush and floss their teeth or to clean their dentures. In some cases, you may need to do the brushing and other dental care tasks. People who have trouble using their hands or who have dementia may need this extra help. How can you help with dental care? Normal dental care  To keep the teeth and gums healthy:  Brush the teeth with fluoride toothpaste twice a day--in the morning and at night--and floss at least once a day. Plaque can quickly build up on the teeth of older adults.   Watch for the signs of gum disease. These signs include gums that bleed after brushing or after eating hard foods, such as apples.  See a dentist regularly. Many experts recommend checkups every 6 months.  Keep the dentist up to date on any new medications the person is taking.  Encourage a balanced diet that includes whole grains, vegetables, and fruits, and that is low in saturated fat and sodium.  Encourage the person you're caring for not to use tobacco products. They can affect dental and general health.  Many older adults have a fixed income and feel that they can't afford dental care. But most [facility] and [facility] have programs in which dentists help older adults by lowering fees. Contact your area's public health offices or  for information about dental care in your area.  Using a toothbrush  Older adults with arthritis sometimes have trouble brushing their teeth because they can't easily hold the toothbrush. Their hands and fingers may be stiff, painful, or weak. If this is the case, you can:  Offer an electric toothbrush.  Enlarge the handle of a non-electric toothbrush by wrapping a sponge, an elastic bandage, or adhesive tape around it.  Push the toothbrush handle through a ball made of rubber or soft foam.  Make the handle longer and thicker by taping Popsicle sticks or tongue depressors to it.  You may also be able to buy special toothbrushes, toothpaste dispensers, and floss holders.  Your doctor may recommend a soft-bristle toothbrush if the person you care for bleeds easily. Bleeding can happen because of a health problem or from certain medicines.  A toothpaste for sensitive teeth may help if the person you care for has sensitive teeth.  How do you brush and floss someone's teeth?  If the person you are caring for has a hard time cleaning their teeth on their own, you may need to brush and floss their teeth for them. It may be easiest to have the person sit and face away from you, and to sit or stand behind them.  That way you can steady their head against your arm as you reach around to floss and brush their teeth. Choose a place that has good lighting and is comfortable for both of you. Before you begin, gather your supplies. You will need gloves, floss, a toothbrush, and a container to hold water if you are not near a sink. Wash and dry your hands well and put on gloves. Start by flossing:  Gently work a piece of floss between each of the teeth toward the gums. A plastic flossing tool may make this easier, and they are available at most [facility]. Curve the floss around each tooth into a U-shape and gently slide it under the gum line. Move the floss firmly up and down several times to scrape off the plaque. After you've finished flossing, throw away the used floss and begin brushing:  Wet the brush and apply toothpaste. Place the brush at a 45-degree angle where the teeth meet the gums. Press firmly, and move the brush in small circles over the surface of the teeth. Be careful not to brush too hard. Vigorous brushing can make the gums pull away from the teeth and can scratch the tooth enamel. Brush all surfaces of the teeth, on the tongue side and on the cheek side. Pay special attention to the front teeth and all surfaces of the back teeth. Brush chewing surfaces with short back-and-forth strokes. After you've finished, help the person rinse the remaining toothpaste from their mouth. Where can you learn more? Go to http://www.woods.com/ and enter F944 to learn more about \"Learning About Dental Care for Older Adults. \"  Current as of: June 16, 2022               Content Version: 13.5  © 9317-8486 Healthwise, Incorporated. Care instructions adapted under license by [facility] ([facility]). If you have questions about a medical condition or this instruction, always ask your healthcare professional. [place] [address] any warranty or liability for your use of this information.   Learning About Vision Tests  What are vision tests?     The four most common vision tests are visual acuity tests, refraction, visual field tests, and color vision tests.  Visual acuity (sharpness) tests  These tests are used:  To see if you need glasses or contact lenses.  To monitor an eye problem.  To check an eye injury.  Visual acuity tests are done as part of routine exams. You may also have this test when you get your 's license or apply for some types of jobs.  Visual field tests  These tests are used:  To check for vision loss in any area of your range of vision.  To screen for certain eye diseases.  To look for nerve damage after a stroke, head injury, or other problem that could reduce blood flow to the brain.  Refraction and color tests  A refraction test is done to find the right prescription for glasses and contact lenses.  A color vision test is done to check for color blindness.  Color vision is often tested as part of a routine exam. You may also have this test when you apply for a job where recognizing different colors is important, such as , electronics, or the .  How are vision tests done?  Visual acuity test   You cover one eye at a time.  You read aloud from a wall chart across the room.  You read aloud from a small card that you hold in your hand.  Refraction   You look into a special device.  The device puts lenses of different strengths in front of each eye to see how strong your glasses or contact lenses need to be.  Visual field tests   Your doctor may have you look through special machines.  Or your doctor may simply have you stare straight ahead while they move a finger into and out of your field of vision.  Color vision test   You look at pieces of printed test patterns in various colors. You say what number or symbol you see.  Your doctor may have you trace the number or symbol using a pointer.  How do these tests feel?  There is very little chance of  having a problem from this test. If dilating drops are used for a vision test, they may make the eyes sting and cause a medicine taste in the mouth. Follow-up care is a key part of your treatment and safety. Be sure to make and go to all appointments, and call your doctor if you are having problems. It's also a good idea to know your test results and keep a list of the medicines you take. Where can you learn more? Go to http://www.reyes.com/ and enter G551 to learn more about \"Learning About Vision Tests. \"  Current as of: October 12, 2022               Content Version: 13.5  © 4438-9339 [employer]cue. Care instructions adapted under license by [facility] ([facility]). If you have questions about a medical condition or this instruction, always ask your healthcare professional. [address] any warranty or liability for your use of this information. Advance Directives: Care Instructions  Overview  An advance directive is a legal way to state your wishes at the end of your life. It tells your family and your doctor what to do if you can't say what you want. There are two main types of advance directives. You can change them any time your wishes change. Living will. This form tells your family and your doctor your wishes about life support and other treatment. The form is also called a declaration. Medical power of . This form lets you name a person to make treatment decisions for you when you can't speak for yourself. This person is called a health care agent (health care proxy, health care surrogate). The form is also called a durable power of  for health care. If you do not have an advance directive, decisions about your medical care may be made by a family member, or by a doctor or a  who doesn't know you. It may help to think of an advance directive as a gift to the people who care for you.  If you have one, they won't have to make tough decisions by themselves. For more information, including forms for your state, see the [address] website (www.caringinfo.org/planning/advance-directives/). Follow-up care is a key part of your treatment and safety. Be sure to make and go to all appointments, and call your doctor if you are having problems. It's also a good idea to know your test results and keep a list of the medicines you take. What should you include in an advance directive? Many states have a unique advance directive form. (It may ask you to address specific issues.) Or you might use a universal form that's approved by many states. If your form doesn't tell you what to address, it may be hard to know what to include in your advance directive. Use the questions below to help you get started. Who do you want to make decisions about your medical care if you are not able to? What life-support measures do you want if you have a serious illness that gets worse over time or can't be cured? What are you most afraid of that might happen? (Maybe you're afraid of having pain, losing your independence, or being kept alive by machines.)  Where would you prefer to die? (Your home? A hospital? A nursing home?)  Do you want to donate your organs when you die? Do you want certain Anglican practices performed before you die? When should you call for help? Be sure to contact your doctor if you have any questions. Where can you learn more? Go to http://www.reyes.com/ and enter R264 to learn more about \"Advance Directives: Care Instructions. \"  Current as of: June 16, 2022               Content Version: 13.5  © 9961-7073 Healthwise, Incorporated. Care instructions adapted under license by [facility] ([facility]). If you have questions about a medical condition or this instruction, always ask your healthcare professional. [address] any warranty or liability for your use of this information.       Personalized Preventive Plan for Mariela Client - 1/16/2023  Medicare offers a range of preventive health benefits. Some of the tests and screenings are paid in full while other may be subject to a deductible, co-insurance, and/or copay. Some of these benefits include a comprehensive review of your medical history including lifestyle, illnesses that may run in your family, and various assessments and screenings as appropriate. After reviewing your medical record and screening and assessments performed today your provider may have ordered immunizations, labs, imaging, and/or referrals for you. A list of these orders (if applicable) as well as your Preventive Care list are included within your After Visit Summary for your review. Other Preventive Recommendations:    A preventive eye exam performed by an eye specialist is recommended every 1-2 years to screen for glaucoma; cataracts, macular degeneration, and other eye disorders. A preventive dental visit is recommended every 6 months. Try to get at least 150 minutes of exercise per week or 10,000 steps per day on a pedometer . Order or download the FREE \"Exercise & Physical Activity: Your Everyday Guide\" from [employer]. Call 9-784.679.3310 or search [employer] online. You need 3441-3128 mg of calcium and 6034-3249 IU of vitamin D per day. It is possible to meet your calcium requirement with diet alone, but a vitamin D supplement is usually necessary to meet this goal.  When exposed to the sun, use a sunscreen that protects against both UVA and UVB radiation with an SPF of 30 or greater. Reapply every 2 to 3 hours or after sweating, drying off with a towel, or swimming. Always wear a seat belt when traveling in a car. Always wear a helmet when riding a bicycle or motorcycle.

## 2023-01-16 NOTE — PROGRESS NOTES
Medicare Annual Wellness Visit    Ana Camacho is here for Medicare AWV, Other (Foot tingling when sleeping), and Wheezing    Assessment & Plan   Initial Medicare annual wellness visit  Wheezing  -     albuterol sulfate HFA (PROVENTIL;VENTOLIN;PROAIR) 108 (90 Base) MCG/ACT inhaler; INHALE 2 PUFFS BY MOUTH EVERY 4 TO 6 HOURS AS NEEDED, Disp-18 g, R-0Normal  -     ipratropium-albuterol (DUONEB) 0.5-2.5 (3) MG/3ML SOLN nebulizer solution; Inhale 3 mLs into the lungs 4 times daily for 14 days, Disp-168 mL, R-0Normal  -     XR CHEST STANDARD (2 VW); Future  -     Brain Natriuretic Peptide; Future  -     [facility], Pulmonology, [address] [place] [address] Rd  Supraventricular tachycardia [facility])  -     Brain Natriuretic Peptide; Future  -     Comprehensive Metabolic Panel, Fasting; Future  -     CBC with Auto Differential; Future  Neuropathy  -     gabapentin (NEURONTIN) 100 MG capsule; Take 1 capsule by mouth at bedtime for 180 days. Intended supply: 90 days, Disp-30 capsule, R-3Normal  -     Comprehensive Metabolic Panel, Fasting; Future  -     CBC with Auto Differential; Future  Primary insomnia  -     traZODone (DESYREL) 50 MG tablet; Take 1 tablet by mouth nightly, Disp-30 tablet, R-3Normal  Long term current use of therapeutic drug  -     Valproic Acid Level, Total and Free; Future  Chronic cough  -     Brain Natriuretic Peptide; Future  -     [facility], Pulmonology, [address] [place] [address] Rd     Cerumen Impaction  She noticed the symptoms in both ears. Wax was impacted and removed by  Lavage. Wax was completely Patient instructed not to use Q-Tips. Recommendations for Preventive Services Due: see orders and patient instructions/AVS.  Recommended screening schedule for the next 5-10 years is provided to the patient in written form: see Patient Instructions/AVS.     Return for Medicare Annual Wellness Visit in 1 year, 3 months.      Subjective   The following acute and/or chronic problems were also addressed today:  When she gets in bed at night he feet get numb. It starts when she is watching TV. It happens every night. She has to get up and walk it off. Sometimes she does get to sleep until 4 am.  She has had a cough for at least a month she coughs up yellow phlegm. Patient's complete Health Risk Assessment and screening values have been reviewed and are found in Flowsheets. The following problems were reviewed today and where indicated follow up appointments were made and/or referrals ordered. Positive Risk Factor Screenings with Interventions:        Depression:  PHQ-2 Score: 2  PHQ-9 Total Score: 7    Interpretation:   1-4 = minimal  5-9 = mild  10-14 = moderate  15-19 = moderately severe  20-27 = severe  Interventions:  Medications adjusted: Trazodone increased to 50 mg PO QHS           Opioid Risk: (Low risk score <55) Opioid risk score: 11    Patient is low risk for opioid use disorder or overdose. Last PDMP Job Foots as Reviewed:  Review User Review Instant Review Result              Last Controlled Substance Monitoring Documentation      [id number]  ED from 2/22/2018 in [facility] Emergency Department   Attestation The Prescription Monitoring Report for this patient was reviewed today. filed at 02/22/2018 1734   Periodic Controlled Substance Monitoring No signs of potential drug abuse or diversion identified.  filed at 02/22/2018 1739               Social and Emotional Support:  Do you get the social and emotional support that you need?: (!) No  Interventions:  Patient declined any further interventions or treatment    Weight and Activity:  Physical Activity: Inactive    Days of Exercise per Week: 0 days    Minutes of Exercise per Session: 0 min     On average, how many days per week do you engage in moderate to strenuous exercise (like a brisk walk)?: 0 days  Have you lost any weight without trying in the past 3 months?: No         Inactivity Interventions:  Encouraged to participate in AL activities      Dentist Screen:  Have you seen the dentist within the past year?: (!) No    Intervention:  Advised to schedule with their dentist    Hearing Screen:  Do you or your family notice any trouble with your hearing that hasn't been managed with hearing aids?: (!) Yes    Interventions:  Patient declines any further evaluation or treatment  Will not wear hearing aides    Vision Screen:  Do you have difficulty driving, watching TV, or doing any of your daily activities because of your eyesight?: No  Have you had an eye exam within the past year?: (!) No  No results found. Interventions:   Patient encouraged to make appointment with their eye specialist  Will let AL know she is willing to see eye doctor that comes to facility                Objective   Vitals:    01/16/23 1614   BP: 118/74   Pulse: 61   SpO2: 98%      There is no height or weight on file to calculate BMI. General Appearance: well-developed and well nourished, in no acute distress, and alert  Skin: warm and dry, no rash or erythema  Head: normocephalic and atraumatic  Eyes: pupils equal, round, and reactive to light, extraocular eye movements intact, conjunctivae normal  ENT: bilateral cerumen impaction noted and hearing abnormal- bilateral  Neck: neck supple and non tender without mass, no thyromegaly or thyroid nodules, no cervical lymphadenopathy   Pulmonary/Chest: wheezing present- throughout  Cardiovascular: normal rate, normal S1 and S2, no gallops, and no carotid bruits  Abdomen: soft, non-tender, non-distended, normal bowel sounds, no masses or organomegaly  Extremities: no cyanosis and no clubbing  Musculoskeletal: wheelchair for long distance mobility  Neurologic: speech normal and no tremor       Allergies   Allergen Reactions    Calcium      Muscle pain     Prior to Visit Medications    Medication Sig Taking?  Authorizing Provider   calcium carbonate (TUMS) 500 MG chewable tablet Take 1 tablet by mouth daily Yes Historical Provider, MD   albuterol sulfate HFA (PROVENTIL;VENTOLIN;PROAIR) 108 (90 Base) MCG/ACT inhaler INHALE 2 PUFFS BY MOUTH EVERY 4 TO 6 HOURS AS NEEDED Yes JOHN Patel CNP   ipratropium-albuterol (DUONEB) 0.5-2.5 (3) MG/3ML SOLN nebulizer solution Inhale 3 mLs into the lungs 4 times daily for 14 days Yes JOHN Patel CNP   traZODone (DESYREL) 50 MG tablet Take 1 tablet by mouth nightly Yes JOHN Patel CNP   gabapentin (NEURONTIN) 100 MG capsule Take 1 capsule by mouth at bedtime for 180 days. Intended supply: 90 days Yes JOHN Patel CNP   loperamide (IMODIUM) 2 MG capsule Take 1 capsule by mouth 4 times daily as needed for Diarrhea Yes JOHN Patel CNP   senna (SENOKOT) 8.6 MG tablet Take 1 tablet by mouth every other day Yes JOHN Patel CNP   atorvastatin (LIPITOR) 20 MG tablet Take 20 mg by mouth daily Yes Historical Provider, MD   busPIRone (BUSPAR) 5 MG tablet Take 5 mg by mouth in the morning and at bedtime Yes Historical Provider, MD   citalopram (CELEXA) 20 mg tablet Take 20 mg by mouth daily Yes Historical Provider, MD   divalproex (DEPAKOTE) 125 MG DR tablet Take 125 mg by mouth in the morning and at bedtime Yes Historical Provider, MD   furosemide (LASIX) 20 MG tablet Take 20 mg by mouth daily Yes Historical Provider, MD   melatonin 5 mg TABS tablet Take 5 mg by mouth daily Yes Historical Provider, MD   acetaminophen (TYLENOL) 325 mg tablet Take 650 mg by mouth every 6 hours as needed for Pain Yes Historical Provider, MD   diclofenac sodium (VOLTAREN) 1 % GEL Apply topically 2 times daily Yes Historical Provider, MD   donepezil (ARICEPT) 5 MG tablet Take 1 tablet by mouth nightly Yes JOHN Patel CNP   metoprolol tartrate (LOPRESSOR) 25 MG tablet Take 1 tablet by mouth 2 times daily Yes Taylor Shaw MD   HYDROcodone-acetaminophen (NORCO) 5-325 MG per tablet Take 1 tablet by mouth every 6 hours as needed for Pain.   Patient not taking: Reported on 1/16/2023  Historical Provider, MD Nolan (Including outside providers/suppliers regularly involved in providing care):   Patient Care Team:  Greig Jeans, APRN - CNP as PCP - General (Family Nurse Practitioner)  Greig Jeans, APRN - CNP as PCP - [facility] Robyn Salmeron Provider     Reviewed and updated this visit:  Tobacco  Allergies  Meds  Problems  Med Hx  Surg Hx  Soc Hx  Fam Hx

## 2023-01-17 ENCOUNTER — TELEPHONE (OUTPATIENT)
Dept: PRIMARY CARE CLINIC | Age: 83
End: 2023-01-17

## 2023-01-17 NOTE — TELEPHONE ENCOUNTER
----- Message from Varsha Davila sent at 1/17/2023  4:28 PM EST -----  Subject: Message to Provider    QUESTIONS  Information for Provider? Patient daughter is calling to let the PCP know   that the X-ray needs to be sent to the Pulmonologist when it is done. She   is trying to work on transportation but it can not be scheduled until   sometime in April   ---------------------------------------------------------------------------  --------------  Alok POOLE  [phone]; OK to leave message on voicemail  ---------------------------------------------------------------------------  --------------  SCRIPT ANSWERS  Relationship to Patient? Other  Representative Name? Kane Franks  Is the Representative on the appropriate HIPAA document in Epic?  Yes

## 2023-01-18 DIAGNOSIS — R06.2 WHEEZING: ICD-10-CM

## 2023-01-25 NOTE — TELEPHONE ENCOUNTER
She is seeing [facility] Pulmonology they should be able to see the results in her chart.

## 2023-03-22 DIAGNOSIS — Z79.899 LONG TERM CURRENT USE OF THERAPEUTIC DRUG: ICD-10-CM

## 2023-04-17 ENCOUNTER — OFFICE VISIT (OUTPATIENT)
Dept: PRIMARY CARE CLINIC | Age: 83
End: 2023-04-17
Payer: MEDICARE

## 2023-04-17 VITALS — DIASTOLIC BLOOD PRESSURE: 70 MMHG | OXYGEN SATURATION: 94 % | SYSTOLIC BLOOD PRESSURE: 148 MMHG

## 2023-04-17 DIAGNOSIS — Z79.899 MEDICATION MANAGEMENT: ICD-10-CM

## 2023-04-17 DIAGNOSIS — I10 PRIMARY HYPERTENSION: ICD-10-CM

## 2023-04-17 DIAGNOSIS — R06.2 WHEEZING: Primary | ICD-10-CM

## 2023-04-17 DIAGNOSIS — F33.41 RECURRENT MAJOR DEPRESSIVE DISORDER, IN PARTIAL REMISSION (HCC): ICD-10-CM

## 2023-04-17 DIAGNOSIS — F41.9 ANXIETY: ICD-10-CM

## 2023-04-17 DIAGNOSIS — H61.23 BILATERAL IMPACTED CERUMEN: ICD-10-CM

## 2023-04-17 DIAGNOSIS — F03.90 DEMENTIA WITHOUT BEHAVIORAL DISTURBANCE (HCC): ICD-10-CM

## 2023-04-17 DIAGNOSIS — F03.918 AGGRESSIVE BEHAVIOR DUE TO DEMENTIA (HCC): ICD-10-CM

## 2023-04-17 DIAGNOSIS — L03.211 CELLULITIS OF FACE: ICD-10-CM

## 2023-04-17 PROCEDURE — 3078F DIAST BP <80 MM HG: CPT | Performed by: NURSE PRACTITIONER

## 2023-04-17 PROCEDURE — 1123F ACP DISCUSS/DSCN MKR DOCD: CPT | Performed by: NURSE PRACTITIONER

## 2023-04-17 PROCEDURE — 99214 OFFICE O/P EST MOD 30 MIN: CPT | Performed by: NURSE PRACTITIONER

## 2023-04-17 PROCEDURE — 3077F SYST BP >= 140 MM HG: CPT | Performed by: NURSE PRACTITIONER

## 2023-04-17 PROCEDURE — 69209 REMOVE IMPACTED EAR WAX UNI: CPT | Performed by: NURSE PRACTITIONER

## 2023-04-17 RX ORDER — ESCITALOPRAM OXALATE 10 MG/1
10 TABLET ORAL DAILY
Qty: 30 TABLET | Refills: 3 | Status: SHIPPED | OUTPATIENT
Start: 2023-04-17

## 2023-04-17 RX ORDER — DIVALPROEX SODIUM 250 MG/1
250 TABLET, DELAYED RELEASE ORAL 2 TIMES DAILY
Qty: 60 TABLET | Refills: 2 | Status: SHIPPED | OUTPATIENT
Start: 2023-04-17

## 2023-04-17 RX ORDER — HYDROXYZINE HYDROCHLORIDE 25 MG/1
25 TABLET, FILM COATED ORAL EVERY 8 HOURS PRN
Qty: 30 TABLET | Refills: 0 | Status: SHIPPED | OUTPATIENT
Start: 2023-04-17 | End: 2023-04-27

## 2023-04-17 RX ORDER — CEPHALEXIN 500 MG/1
500 CAPSULE ORAL 3 TIMES DAILY
Qty: 30 CAPSULE | Refills: 0 | Status: SHIPPED | OUTPATIENT
Start: 2023-04-17 | End: 2023-04-27

## 2023-04-17 RX ORDER — PREDNISONE 10 MG/1
TABLET ORAL
Qty: 18 TABLET | Refills: 0 | Status: SHIPPED | OUTPATIENT
Start: 2023-04-17 | End: 2023-04-26

## 2023-04-17 RX ORDER — DONEPEZIL HYDROCHLORIDE 10 MG/1
10 TABLET, FILM COATED ORAL NIGHTLY
Qty: 30 TABLET | Refills: 1 | Status: SHIPPED | OUTPATIENT
Start: 2023-04-17

## 2023-04-17 ASSESSMENT — ENCOUNTER SYMPTOMS
WHEEZING: 1
CONSTIPATION: 0
SHORTNESS OF BREATH: 1
SINUS PAIN: 0
COUGH: 1
DIARRHEA: 1
NAUSEA: 0

## 2023-04-17 NOTE — PROGRESS NOTES
[address] [address] PRIMARY CARE  [facility] [address]  [facility] [place] [address]  Dept: [phone]    Crystal Stuart is a 80 y.o. female Established patient, who presents today for her medical conditions/complaints as noted below. Chief Complaint   Patient presents with    Wheezing     No improvement    Insomnia     Still unable to fall asleep        HPI:     HPI   Patient is refusing housekeeping. Daughter feels that her bathroom is a health hazard. Staff reports that resident is throwing things at the house keepers including shoes books what ever she can get her hands on. Daughter has noticed a more rapid decrease in her memory. She sometimes forget if she gets up or if she eats. She is regularly in a nasty mood and wants to argue. She tells daughter that the housekeeping never comes in. She is asking for meds for loose stools. She has frequent loose stools. She will not allow anyone to do her laundry. She washes out her underwear in the sink. She has not taken a shower in at least 2 weeks. She has an appointment with pulmonologist next week, Wednesday.       Reviewed prior notes: None   Reviewed previous:   lab, chest x-ray    LDL Cholesterol (mg/dL)   Date Value   01/25/2021 60       (goal LDL is <100)   AST (U/L)   Date Value   01/25/2021 23     ALT (U/L)   Date Value   01/25/2021 11     BUN (mg/dL)   Date Value   05/12/2022 18     Hemoglobin A1C (%)   Date Value   01/25/2021 5.6     BP Readings from Last 3 Encounters:   04/17/23 (!) 148/70   01/16/23 118/74   10/17/22 108/62          (goal 120/80)    Past Medical History:   Diagnosis Date    Anxiety     Asthma     Depression     Hyperlipidemia     Neuropathy 2014    bottom of feet      Past Surgical History:   Procedure Laterality Date    ANKLE FRACTURE SURGERY Right 03/02/2018    ORIF    ANKLE SURGERY Right 03/02/2018    ORIF    APPENDECTOMY      CATARACT REMOVAL WITH IMPLANT      HERNIA REPAIR  04/2022    NASAL

## 2023-04-17 NOTE — PATIENT INSTRUCTIONS
1.  Discontinue citalopram  2. Start Lexapro 10 mg p.o. daily  3. Prednisone taper dose: 30 mg p.o. daily x3 days, then 20 mg p.o. daily x3 days, then 10 mg p.o. daily x3 days  4. Increase donepezil to 10 mg p.o. nightly  5. Keflex 500 mg p.o. 3 times daily x10 days, diagnosis cellulitis  6. Increase Depakote to 250 mg p.o. twice daily  7. Hydroxyzine 25 mg p.o. every 8 hours as needed anxiety and itching  8. CBC with differential and CMP/lab draw  9. Urinalysis with reflex to culture  10.   Tea tree shampoo/wash hair 2 times per week in shower-staff to assist with washing hair and shower

## 2023-04-20 LAB
ALBUMIN SERPL-MCNC: 3.7 G/DL
ALP BLD-CCNC: 98 U/L
ALT SERPL-CCNC: 6 U/L
ANION GAP SERPL CALCULATED.3IONS-SCNC: NORMAL MMOL/L
AST SERPL-CCNC: 10 U/L
BASOPHILS ABSOLUTE: 0.1 /ΜL
BASOPHILS RELATIVE PERCENT: 1.2 %
BILIRUB SERPL-MCNC: 0.4 MG/DL (ref 0.1–1.4)
BUN BLDV-MCNC: 19 MG/DL
CALCIUM SERPL-MCNC: 9 MG/DL
CHLORIDE BLD-SCNC: 104 MMOL/L
CO2: 27 MMOL/L
CREAT SERPL-MCNC: 0.9 MG/DL
EGFR: 60
EOSINOPHILS ABSOLUTE: 0.1 /ΜL
EOSINOPHILS RELATIVE PERCENT: 0.5 %
GLUCOSE BLD-MCNC: 94 MG/DL
HCT VFR BLD CALC: 40.1 % (ref 36–46)
HEMOGLOBIN: 12.8 G/DL (ref 12–16)
LYMPHOCYTES ABSOLUTE: 1 /ΜL
LYMPHOCYTES RELATIVE PERCENT: 16.1 %
MCH RBC QN AUTO: 28.2 PG
MCHC RBC AUTO-ENTMCNC: 32 G/DL
MCV RBC AUTO: 88 FL
MONOCYTES ABSOLUTE: 0.7 /ΜL
MONOCYTES RELATIVE PERCENT: 12.1 %
NEUTROPHILS ABSOLUTE: 4.2 /ΜL
NEUTROPHILS RELATIVE PERCENT: 70.1 %
PDW BLD-RTO: 15.9 %
PLATELET # BLD: 249 K/ΜL
PMV BLD AUTO: 10.3 FL
POTASSIUM SERPL-SCNC: 4 MMOL/L
RBC # BLD: 4.55 10^6/ΜL
SODIUM BLD-SCNC: 144 MMOL/L
TOTAL PROTEIN: 6.4
WBC # BLD: 6 10^3/ML

## 2023-04-26 ENCOUNTER — HOSPITAL ENCOUNTER (OUTPATIENT)
Dept: GENERAL RADIOLOGY | Age: 83
Discharge: HOME | End: 2023-04-28
Payer: MEDICARE

## 2023-04-26 ENCOUNTER — HOSPITAL ENCOUNTER (OUTPATIENT)
Age: 83
Discharge: HOME | End: 2023-04-28
Payer: MEDICARE

## 2023-04-26 ENCOUNTER — OFFICE VISIT (OUTPATIENT)
Dept: PULMONOLOGY | Age: 83
End: 2023-04-26
Payer: MEDICARE

## 2023-04-26 VITALS
RESPIRATION RATE: 14 BRPM | HEART RATE: 65 BPM | WEIGHT: 200 LBS | DIASTOLIC BLOOD PRESSURE: 78 MMHG | HEIGHT: 65 IN | SYSTOLIC BLOOD PRESSURE: 129 MMHG | BODY MASS INDEX: 33.32 KG/M2 | OXYGEN SATURATION: 94 %

## 2023-04-26 DIAGNOSIS — J39.9 DISORDER OF UPPER AIRWAY: ICD-10-CM

## 2023-04-26 DIAGNOSIS — R06.00 DYSPNEA, UNSPECIFIED TYPE: ICD-10-CM

## 2023-04-26 DIAGNOSIS — J39.8 TRACHEAL DEVIATION: ICD-10-CM

## 2023-04-26 DIAGNOSIS — R05.3 CHRONIC COUGH: ICD-10-CM

## 2023-04-26 DIAGNOSIS — R13.12 OROPHARYNGEAL DYSPHAGIA: ICD-10-CM

## 2023-04-26 DIAGNOSIS — I10 PRIMARY HYPERTENSION: ICD-10-CM

## 2023-04-26 DIAGNOSIS — J45.40 MODERATE PERSISTENT REACTIVE AIRWAY DISEASE WITHOUT COMPLICATION: ICD-10-CM

## 2023-04-26 DIAGNOSIS — R06.00 DYSPNEA, UNSPECIFIED TYPE: Primary | ICD-10-CM

## 2023-04-26 PROCEDURE — 94010 BREATHING CAPACITY TEST: CPT | Performed by: INTERNAL MEDICINE

## 2023-04-26 PROCEDURE — 71046 X-RAY EXAM CHEST 2 VIEWS: CPT

## 2023-04-26 PROCEDURE — 94729 DIFFUSING CAPACITY: CPT | Performed by: INTERNAL MEDICINE

## 2023-04-26 PROCEDURE — 3078F DIAST BP <80 MM HG: CPT | Performed by: INTERNAL MEDICINE

## 2023-04-26 PROCEDURE — 1123F ACP DISCUSS/DSCN MKR DOCD: CPT | Performed by: INTERNAL MEDICINE

## 2023-04-26 PROCEDURE — 99204 OFFICE O/P NEW MOD 45 MIN: CPT | Performed by: INTERNAL MEDICINE

## 2023-04-26 PROCEDURE — 94726 PLETHYSMOGRAPHY LUNG VOLUMES: CPT | Performed by: INTERNAL MEDICINE

## 2023-04-26 PROCEDURE — 3074F SYST BP LT 130 MM HG: CPT | Performed by: INTERNAL MEDICINE

## 2023-04-26 RX ORDER — BUDESONIDE 0.5 MG/2ML
1 INHALANT ORAL 2 TIMES DAILY
Qty: 60 EACH | Refills: 5 | Status: SHIPPED | OUTPATIENT
Start: 2023-04-26

## 2023-04-26 RX ORDER — ARFORMOTEROL TARTRATE 15 UG/2ML
1 SOLUTION RESPIRATORY (INHALATION) 2 TIMES DAILY
Qty: 120 ML | Refills: 5 | Status: SHIPPED | OUTPATIENT
Start: 2023-04-26

## 2023-04-26 NOTE — PATIENT INSTRUCTIONS
Daughter requested CT orders - due to mom being in facility, she needs to arrange transportation. She will call [facility] to scheduled. Speech Tx referral was also provided with AVS - she will check with facility.    LS

## 2023-04-26 NOTE — PROGRESS NOTES
Patient had difficulty performing
Pulmonary function test.  Date of study 04/26/2023. Spirometry shows FEV1 is 0.63 31% corrected. FVC is 1.02 38% predicted. FEV1 FVC ratio is consistent with obstructive ventilatory impairment. Lung volumes shows residual volume is 1.24 58% predicted. Total lung capacity is 2.08 42% predicted consistent with severe restrictive ventilatory impairment which could be intraparenchymal.  Diffusion capacity is 4.25 26% predicted but corrected for elevated volume is 72% predicted. Consistent with reduction diffusion capacity which could be secondary to intraparenchymal lung disease, emphysema, anemia or pulmonary vascular disease. Impression: This pulmonary function test is consistent with severe restrictive ventilatory impairment likely a combination of intraparenchymal and extraparenchymal restriction. There is reduction in diffusion capacity which could be secondary to intraparenchymal lung disease, emphysema or pulm vascular disease or anemia there is a concomitant obstructive ventilatory impairment according to FEV1 FVC ratio.   Clinical correlation is recommended
with left basilar atelectasis and diaphragm elevation. Recent chest x-ray images not available  CT scan of the chest on 01/20/2021 showed tracheal deviation to the left and the right thyroid goiter pushing trachea and mild left diaphragm elevation    Patient would not be amenable to inhaler and I would recommend to start patient on inhaled steroid and inhaled LABA with nebulizer per daughter patient has been refusing nebulizer which is DuoNeb in the facility and does not use it. Pulmonary function test as mentioned limited as patient was not able to perform the maneuvers but look like restriction and possibly combined obstruction which is severe restriction and severe reduction diffusion capacity chest x-ray report and January 2023 did not show evidence of infiltrate although I do not have images available but report is available I did not hear crackles on exam but she has upper airway sounds connected. I will get a chest x-ray films today and I would recommend a CT scan of the soft tissue of the neck to evaluate upper airway trachea vocal cord and thyroid and a CT scan of the chest to better evaluate lung parenchyma and testing him because of chest x-ray and CT finding and January 2021. Apparently patient had some labs done by primary physician I would recommend to follow-up those labs per primary physician and will suggest recheck thyroid function studies and thyroid ultrasound. Plan and recommendation:    Recommend thyroid function studies and thyroid ultrasound. Recommend speech evaluation for swallowing and for aspiration evaluation treat and management. She will likely need ENT evaluation  We will start Pulmicort 0.5 mcg nebulizer twice daily. Start Brovana unit dose aerosol twice daily. Albuterol nebulizer to be used as needed. Chest x-ray today. Schedule CT scan of the soft tissue of the neck.   Schedule CT scan of the chest.  Labs to be followed by primary physician suggest thyroid function

## 2023-05-15 ENCOUNTER — TELEPHONE (OUTPATIENT)
Dept: PULMONOLOGY | Age: 83
End: 2023-05-15

## 2023-05-15 NOTE — TELEPHONE ENCOUNTER
Patient was seen 4/26/23 as a new patient. At the time of that visit, she was referred to speech therapy and XR and CT was ordered. The patient refused speech therapist and kicked them out of her room and was scheduled today for CT chest and CT neck but kicked transport out and refuses to come for CT. Daughter states patient had a Chest XR done 4/26 and would like to know results to that and what to do about the treatment the patient is refusing. Patient is scheduled for a follow up 8/9/23. Please advise.

## 2023-05-16 DIAGNOSIS — G62.9 NEUROPATHY: ICD-10-CM

## 2023-05-16 RX ORDER — GABAPENTIN 100 MG/1
CAPSULE ORAL
Qty: 30 CAPSULE | Refills: 2 | Status: SHIPPED | OUTPATIENT
Start: 2023-05-16 | End: 2023-06-15

## 2023-05-16 NOTE — TELEPHONE ENCOUNTER
Called daughter back and explained Dr. Cecil Bumpers note. She understands and states she will follow with PCP.

## 2023-05-16 NOTE — TELEPHONE ENCOUNTER
LAST VISIT:   4/17/2023     Future Appointments   Date Time Provider Leydi Alaniz   7/17/2023  4:00 PM JOHN Dahl - [facility] Cindi Arias

## 2023-07-17 ENCOUNTER — OFFICE VISIT (OUTPATIENT)
Dept: PRIMARY CARE CLINIC | Age: 83
End: 2023-07-17
Payer: MEDICARE

## 2023-07-17 VITALS — HEART RATE: 58 BPM | OXYGEN SATURATION: 95 % | DIASTOLIC BLOOD PRESSURE: 68 MMHG | SYSTOLIC BLOOD PRESSURE: 126 MMHG

## 2023-07-17 DIAGNOSIS — F03.90 DEMENTIA WITHOUT BEHAVIORAL DISTURBANCE (HCC): ICD-10-CM

## 2023-07-17 DIAGNOSIS — I10 PRIMARY HYPERTENSION: Primary | ICD-10-CM

## 2023-07-17 DIAGNOSIS — F33.41 RECURRENT MAJOR DEPRESSIVE DISORDER, IN PARTIAL REMISSION (HCC): ICD-10-CM

## 2023-07-17 PROCEDURE — 3078F DIAST BP <80 MM HG: CPT | Performed by: NURSE PRACTITIONER

## 2023-07-17 PROCEDURE — 1123F ACP DISCUSS/DSCN MKR DOCD: CPT | Performed by: NURSE PRACTITIONER

## 2023-07-17 PROCEDURE — 3074F SYST BP LT 130 MM HG: CPT | Performed by: NURSE PRACTITIONER

## 2023-07-17 PROCEDURE — 99214 OFFICE O/P EST MOD 30 MIN: CPT | Performed by: NURSE PRACTITIONER

## 2023-07-17 RX ORDER — HYDROXYZINE HYDROCHLORIDE 25 MG/1
25 TABLET, FILM COATED ORAL EVERY 8 HOURS PRN
COMMUNITY

## 2023-07-17 RX ORDER — DONEPEZIL HYDROCHLORIDE 10 MG/1
20 TABLET, FILM COATED ORAL NIGHTLY
Qty: 30 TABLET | Refills: 1 | Status: SHIPPED | OUTPATIENT
Start: 2023-07-17

## 2023-07-17 SDOH — ECONOMIC STABILITY: HOUSING INSECURITY
IN THE LAST 12 MONTHS, WAS THERE A TIME WHEN YOU DID NOT HAVE A STEADY PLACE TO SLEEP OR SLEPT IN A SHELTER (INCLUDING NOW)?: NO

## 2023-07-17 SDOH — ECONOMIC STABILITY: FOOD INSECURITY: WITHIN THE PAST 12 MONTHS, YOU WORRIED THAT YOUR FOOD WOULD RUN OUT BEFORE YOU GOT MONEY TO BUY MORE.: NEVER TRUE

## 2023-07-17 SDOH — ECONOMIC STABILITY: FOOD INSECURITY: WITHIN THE PAST 12 MONTHS, THE FOOD YOU BOUGHT JUST DIDN'T LAST AND YOU DIDN'T HAVE MONEY TO GET MORE.: NEVER TRUE

## 2023-07-17 SDOH — ECONOMIC STABILITY: INCOME INSECURITY: HOW HARD IS IT FOR YOU TO PAY FOR THE VERY BASICS LIKE FOOD, HOUSING, MEDICAL CARE, AND HEATING?: NOT HARD AT ALL

## 2023-07-17 ASSESSMENT — ENCOUNTER SYMPTOMS
SINUS PRESSURE: 0
NAUSEA: 0
CONSTIPATION: 0
SHORTNESS OF BREATH: 1
WHEEZING: 1
DIARRHEA: 0
SINUS PAIN: 0

## 2023-07-17 NOTE — PROGRESS NOTES
bruising or erythema. Neurological:      Mental Status: She is alert. She is disoriented. Motor: Weakness present. Gait: Gait abnormal.   Psychiatric:         Mood and Affect: Mood normal.         Speech: Speech normal.         Behavior: Behavior is agitated. Thought Content: Thought content normal.         Cognition and Memory: Cognition is impaired. Memory is impaired. Assessment/Plan:   1. Primary hypertension  2. Recurrent major depressive disorder, in partial remission (HCC)  -     sertraline (ZOLOFT) 50 MG tablet; Take 1 tablet by mouth daily, Disp-90 tablet, R-1Normal  3. Dementia without behavioral disturbance (HCC)  -     donepezil (ARICEPT) 10 MG tablet; Take 2 tablets by mouth nightly, Disp-30 tablet, R-1Normal     Discontinue Lexapro  Start Zoloft 50 mg daily  Increase Aricept to 20 mg nightly  [address] [facility] for palliative care  Return in about 3 months (around 10/17/2023) for wheeze, dementa. Data Unavailable     No orders of the defined types were placed in this encounter. Orders Placed This Encounter   Medications    sertraline (ZOLOFT) 50 MG tablet     Sig: Take 1 tablet by mouth daily     Dispense:  90 tablet     Refill:  1    donepezil (ARICEPT) 10 MG tablet     Sig: Take 2 tablets by mouth nightly     Dispense:  30 tablet     Refill:  1       Patient given educational materials - see patient instructions. Discussed use, benefit, and side effects of prescribed medications. All patient questions answered. Pt voiced understanding. Reviewed health maintenance. Instructed to continue current medications, diet and exercise. Patient agreed with treatment plan. Follow up as directed.      Electronically signed by JOHN Bay CNP on 7/17/2023 at 9:45 PM

## 2023-08-11 ENCOUNTER — OUTSIDE SERVICES (OUTPATIENT)
Dept: PRIMARY CARE CLINIC | Age: 83
End: 2023-08-11

## 2023-08-11 DIAGNOSIS — H10.33 ACUTE BACTERIAL CONJUNCTIVITIS OF BOTH EYES: Primary | ICD-10-CM

## 2023-08-11 DIAGNOSIS — L03.213 PERIORBITAL CELLULITIS, UNSPECIFIED LATERALITY: ICD-10-CM

## 2023-08-13 ASSESSMENT — ENCOUNTER SYMPTOMS
EYE ITCHING: 1
VOMITING: 0
COUGH: 0
EYE DISCHARGE: 1
NAUSEA: 0
EYE REDNESS: 1
BLURRED VISION: 0
SHORTNESS OF BREATH: 0
PHOTOPHOBIA: 1
EYE PAIN: 1
DOUBLE VISION: 0

## 2023-09-26 ENCOUNTER — TELEPHONE (OUTPATIENT)
Dept: PRIMARY CARE CLINIC | Age: 83
End: 2023-09-26

## 2023-09-26 NOTE — TELEPHONE ENCOUNTER
FAMILY [facility]) ADVISED AFTER OCTOBER 1, 2023 ANTHEM MEDICARE PPO WILL PAY MORE OUT OF POCKET WHEN SEEN AT A [facility]. ANTHEM MEDICARE HMO WILL NOT BE ACCEPTED AND ALL EXPENSES AT A [facility] FACILITY WILL BE SELF PAY. ADVISED TO TRY TO SWITCH TO A DIFFERENT MEDICARE PLAN. ADVISED FAMILY I HAVE NOTIFED NURSING STAFF OF THIS AS WELL. TOLD FAMILY PATIENT CAN BE SEEN AT [facility] BUT WILL PAY MORE OUT OF POCKET OR BE SELF PAY. FAMILY UNDERSTANDS    Elverna Ormond will try to switch Davey Craig to a different plan. States Davey Craig is in Hospice. Stressed the importance of switching plans as no hospitals, etc will be covered. Voices understand.

## 2023-11-27 ENCOUNTER — TELEPHONE (OUTPATIENT)
Dept: PRIMARY CARE CLINIC | Age: 83
End: 2023-11-27

## 2023-11-27 NOTE — TELEPHONE ENCOUNTER
Gallito Banks  for pt calling. Pt is  in Hospice with [facility] ([place] Has been on Hospice for 2-3 months. Pt's daughter asking why pt would be put on Donepezil, if on hospice? Just curious. Please advise.  SLIM  [phone]

## 2023-11-27 NOTE — TELEPHONE ENCOUNTER
I have not seen her since July in office and August in [place]. Donepezil was not requested to be discontinued by hospice or family. If family or hospice  would like donepezil please let us know and it can be discontinued  She should probably be seen in December or January. This can be arranged by AL nurse if she would like.

## (undated) DEVICE — C-ARMOR C-ARM EQUIPMENT COVERS CLEAR STERILE UNIVERSAL FIT 12 PER CASE: Brand: C-ARMOR

## (undated) DEVICE — SCREW BNE L12MM DIA3.5MM CORT S STL ST NONCANNULATED LOK: Type: IMPLANTABLE DEVICE | Site: ANKLE | Status: NON-FUNCTIONAL

## (undated) DEVICE — PADDING,UNDERCAST,COTTON, 3X4YD STERILE: Brand: MEDLINE

## (undated) DEVICE — ORTHO EXT PK

## (undated) DEVICE — SUTURE ETHLN SZ 3-0 L18IN NONABSORBABLE BLK FS-1 L24MM 3/8 663H

## (undated) DEVICE — GLOVE ORANGE PI 7 1/2   MSG9075

## (undated) DEVICE — BIT DRL L110MM DIA2.5MM G QUIK CPL W/O STP REUSE

## (undated) DEVICE — DRAPE C ARM UNIV W41XL74IN CLR PLAS XR VELC CLSR POLY STRP

## (undated) DEVICE — ZIMMER® STERILE DISPOSABLE TOURNIQUET CUFF WITH PROTECTIVE SLEEVE AND PLC, DUAL PORT, SINGLE BLADDER, 34 IN. (86 CM)

## (undated) DEVICE — 3M™ COBAN™ NL STERILE NON-LATEX SELF-ADHERENT WRAP, 2084S, 4 IN X 5 YD (10 CM X 4,5 M), 18 ROLLS/CASE: Brand: 3M™ COBAN™

## (undated) DEVICE — GLOVE ORANGE PI 8   MSG9080

## (undated) DEVICE — GLOVE ORANGE PI 7   MSG9070

## (undated) DEVICE — SUTURE MCRYL SZ 3-0 L18IN ABSRB UD L19MM PS-2 3/8 CIR PRIM Y497G

## (undated) DEVICE — 3M™ IOBAN™ 2 ANTIMICROBIAL INCISE DRAPE 6650EZ: Brand: IOBAN™ 2

## (undated) DEVICE — GARMENT COMPR STD FOR 17IN CALF UNIF THER FLOTRN

## (undated) DEVICE — ADHESIVE SKIN CLSR 0.7ML TOP DERMBND ADV

## (undated) DEVICE — ZIMMER® STERILE DISPOSABLE TOURNIQUET CUFF WITH PROTECTIVE SLEEVE AND PLC, DUAL PORT, SINGLE BLADDER, 24 IN. (61 CM)

## (undated) DEVICE — SUTURE VCRL SZ 0 L18IN ABSRB VLT L36MM CT-1 1/2 CIR J740D

## (undated) DEVICE — SCREW BNE L14MM DIA4MM CANC S STL ST CANN NONLOCKING FULL: Type: IMPLANTABLE DEVICE | Site: ANKLE | Status: NON-FUNCTIONAL

## (undated) DEVICE — INTENDED TO BE USED TO OCCLUDE, RETRACT AND IDENTIFY ARTERIES, VEINS, TENDONS AND NERVES IN SURGICAL PROCEDURES: Brand: STERION®  VESSEL LOOP

## (undated) DEVICE — GLOVE ORANGE PI 8 1/2   MSG9085

## (undated) DEVICE — SUTURE VCRL SZ 2-0 L18IN ABSRB VLT L26MM SH 1/2 CIR J775D

## (undated) DEVICE — GLOVE SURG SZ 65 THK91MIL LTX FREE SYN POLYISOPRENE

## (undated) DEVICE — BIT DRL L140MM DIA2MM QUIK CPL 3 FLUT CALIB DEPTH MRK W/O

## (undated) DEVICE — PADDING,UNDERCAST,COTTON, 4"X4YD STERILE: Brand: MEDLINE

## (undated) DEVICE — CHLORAPREP 26ML ORANGE